# Patient Record
Sex: FEMALE | Race: WHITE
[De-identification: names, ages, dates, MRNs, and addresses within clinical notes are randomized per-mention and may not be internally consistent; named-entity substitution may affect disease eponyms.]

---

## 2019-06-30 ENCOUNTER — HOSPITAL ENCOUNTER (INPATIENT)
Dept: HOSPITAL 92 - ERS | Age: 84
LOS: 12 days | Discharge: NURSING, REHAB OR LONG-TERM CARE | DRG: 273 | End: 2019-07-12
Attending: FAMILY MEDICINE | Admitting: FAMILY MEDICINE
Payer: MEDICARE

## 2019-06-30 VITALS — BODY MASS INDEX: 33.1 KG/M2

## 2019-06-30 DIAGNOSIS — I47.1: ICD-10-CM

## 2019-06-30 DIAGNOSIS — N17.9: ICD-10-CM

## 2019-06-30 DIAGNOSIS — E03.9: ICD-10-CM

## 2019-06-30 DIAGNOSIS — L12.0: ICD-10-CM

## 2019-06-30 DIAGNOSIS — E87.6: ICD-10-CM

## 2019-06-30 DIAGNOSIS — D72.829: ICD-10-CM

## 2019-06-30 DIAGNOSIS — E87.1: ICD-10-CM

## 2019-06-30 DIAGNOSIS — I95.9: ICD-10-CM

## 2019-06-30 DIAGNOSIS — I11.0: ICD-10-CM

## 2019-06-30 DIAGNOSIS — J96.01: ICD-10-CM

## 2019-06-30 DIAGNOSIS — I48.91: Primary | ICD-10-CM

## 2019-06-30 DIAGNOSIS — D64.9: ICD-10-CM

## 2019-06-30 DIAGNOSIS — N18.3: ICD-10-CM

## 2019-06-30 DIAGNOSIS — Z79.4: ICD-10-CM

## 2019-06-30 DIAGNOSIS — E78.5: ICD-10-CM

## 2019-06-30 DIAGNOSIS — I50.33: ICD-10-CM

## 2019-06-30 DIAGNOSIS — K59.00: ICD-10-CM

## 2019-06-30 DIAGNOSIS — E66.9: ICD-10-CM

## 2019-06-30 DIAGNOSIS — E11.22: ICD-10-CM

## 2019-06-30 DIAGNOSIS — I27.20: ICD-10-CM

## 2019-06-30 LAB
ALBUMIN SERPL BCG-MCNC: 3.1 G/DL (ref 3.4–4.8)
ALP SERPL-CCNC: 100 U/L (ref 40–150)
ALT SERPL W P-5'-P-CCNC: 13 U/L (ref 8–55)
ANION GAP SERPL CALC-SCNC: 17 MMOL/L (ref 10–20)
APTT PPP: 25 SEC (ref 22.9–36.1)
AST SERPL-CCNC: 9 U/L (ref 5–34)
BASOPHILS # BLD AUTO: 0 THOU/UL (ref 0–0.2)
BASOPHILS NFR BLD AUTO: 0.3 % (ref 0–1)
BILIRUB SERPL-MCNC: 0.5 MG/DL (ref 0.2–1.2)
BUN SERPL-MCNC: 21 MG/DL (ref 9.8–20.1)
CALCIUM SERPL-MCNC: 7.9 MG/DL (ref 7.8–10.44)
CHLORIDE SERPL-SCNC: 102 MMOL/L (ref 98–107)
CK MB SERPL-MCNC: 1.6 NG/ML (ref 0–6.6)
CK SERPL-CCNC: 25 U/L (ref 29–168)
CO2 SERPL-SCNC: 18 MMOL/L (ref 23–31)
CREAT CL PREDICTED SERPL C-G-VRATE: 0 ML/MIN (ref 70–130)
EOSINOPHIL # BLD AUTO: 0.1 THOU/UL (ref 0–0.7)
EOSINOPHIL NFR BLD AUTO: 1.2 % (ref 0–10)
GLOBULIN SER CALC-MCNC: 2.1 G/DL (ref 2.4–3.5)
GLUCOSE SERPL-MCNC: 300 MG/DL (ref 83–110)
GLUCOSE UR STRIP-MCNC: 100 MG/DL
HGB BLD-MCNC: 11.8 G/DL (ref 12–16)
HYALINE CASTS #/AREA URNS LPF: (no result) LPF
INR PPP: 1.1
LIPASE SERPL-CCNC: 16 U/L (ref 8–78)
LYMPHOCYTES # BLD: 2.3 THOU/UL (ref 1.2–3.4)
LYMPHOCYTES NFR BLD AUTO: 19.7 % (ref 21–51)
MAGNESIUM SERPL-MCNC: 1.4 MG/DL (ref 1.6–2.6)
MCH RBC QN AUTO: 33 PG (ref 27–31)
MCV RBC AUTO: 96.8 FL (ref 78–98)
MONOCYTES # BLD AUTO: 0.7 THOU/UL (ref 0.11–0.59)
MONOCYTES NFR BLD AUTO: 6.2 % (ref 0–10)
NEUTROPHILS # BLD AUTO: 8.3 THOU/UL (ref 1.4–6.5)
NEUTROPHILS NFR BLD AUTO: 72.6 % (ref 42–75)
PLATELET # BLD AUTO: 135 THOU/UL (ref 130–400)
POTASSIUM SERPL-SCNC: 3.6 MMOL/L (ref 3.5–5.1)
PROTHROMBIN TIME: 13.9 SEC (ref 12–14.7)
RBC # BLD AUTO: 3.56 MILL/UL (ref 4.2–5.4)
SODIUM SERPL-SCNC: 133 MMOL/L (ref 136–145)
TROPONIN I SERPL DL<=0.01 NG/ML-MCNC: 0.1 NG/ML (ref ?–0.03)
TROPONIN I SERPL DL<=0.01 NG/ML-MCNC: 0.13 NG/ML (ref ?–0.03)
URNS CMNT MICRO: (no result)
WBC # BLD AUTO: 11.5 THOU/UL (ref 4.8–10.8)
WBC UR QL AUTO: (no result) HPF (ref 0–3)

## 2019-06-30 PROCEDURE — 96361 HYDRATE IV INFUSION ADD-ON: CPT

## 2019-06-30 PROCEDURE — 83605 ASSAY OF LACTIC ACID: CPT

## 2019-06-30 PROCEDURE — 36415 COLL VENOUS BLD VENIPUNCTURE: CPT

## 2019-06-30 PROCEDURE — 93653 COMPRE EP EVAL TX SVT: CPT

## 2019-06-30 PROCEDURE — C1769 GUIDE WIRE: HCPCS

## 2019-06-30 PROCEDURE — 80053 COMPREHEN METABOLIC PANEL: CPT

## 2019-06-30 PROCEDURE — 83735 ASSAY OF MAGNESIUM: CPT

## 2019-06-30 PROCEDURE — 85610 PROTHROMBIN TIME: CPT

## 2019-06-30 PROCEDURE — 84439 ASSAY OF FREE THYROXINE: CPT

## 2019-06-30 PROCEDURE — 96376 TX/PRO/DX INJ SAME DRUG ADON: CPT

## 2019-06-30 PROCEDURE — 84100 ASSAY OF PHOSPHORUS: CPT

## 2019-06-30 PROCEDURE — 83880 ASSAY OF NATRIURETIC PEPTIDE: CPT

## 2019-06-30 PROCEDURE — 84443 ASSAY THYROID STIM HORMONE: CPT

## 2019-06-30 PROCEDURE — 87086 URINE CULTURE/COLONY COUNT: CPT

## 2019-06-30 PROCEDURE — 93613 INTRACARDIAC EPHYS 3D MAPG: CPT

## 2019-06-30 PROCEDURE — 93005 ELECTROCARDIOGRAM TRACING: CPT

## 2019-06-30 PROCEDURE — 87070 CULTURE OTHR SPECIMN AEROBIC: CPT

## 2019-06-30 PROCEDURE — A4353 INTERMITTENT URINARY CATH: HCPCS

## 2019-06-30 PROCEDURE — 87077 CULTURE AEROBIC IDENTIFY: CPT

## 2019-06-30 PROCEDURE — 87205 SMEAR GRAM STAIN: CPT

## 2019-06-30 PROCEDURE — 81003 URINALYSIS AUTO W/O SCOPE: CPT

## 2019-06-30 PROCEDURE — 85730 THROMBOPLASTIN TIME PARTIAL: CPT

## 2019-06-30 PROCEDURE — 76942 ECHO GUIDE FOR BIOPSY: CPT

## 2019-06-30 PROCEDURE — 87186 SC STD MICRODIL/AGAR DIL: CPT

## 2019-06-30 PROCEDURE — 70450 CT HEAD/BRAIN W/O DYE: CPT

## 2019-06-30 PROCEDURE — 93306 TTE W/DOPPLER COMPLETE: CPT

## 2019-06-30 PROCEDURE — 80048 BASIC METABOLIC PNL TOTAL CA: CPT

## 2019-06-30 PROCEDURE — 84484 ASSAY OF TROPONIN QUANT: CPT

## 2019-06-30 PROCEDURE — 82550 ASSAY OF CK (CPK): CPT

## 2019-06-30 PROCEDURE — 83036 HEMOGLOBIN GLYCOSYLATED A1C: CPT

## 2019-06-30 PROCEDURE — C1730 CATH, EP, 19 OR FEW ELECT: HCPCS

## 2019-06-30 PROCEDURE — 71045 X-RAY EXAM CHEST 1 VIEW: CPT

## 2019-06-30 PROCEDURE — 93623 PRGRMD STIMJ&PACG IV RX NFS: CPT

## 2019-06-30 PROCEDURE — 51701 INSERT BLADDER CATHETER: CPT

## 2019-06-30 PROCEDURE — 93010 ELECTROCARDIOGRAM REPORT: CPT

## 2019-06-30 PROCEDURE — C1732 CATH, EP, DIAG/ABL, 3D/VECT: HCPCS

## 2019-06-30 PROCEDURE — 96366 THER/PROPH/DIAG IV INF ADDON: CPT

## 2019-06-30 PROCEDURE — 96365 THER/PROPH/DIAG IV INF INIT: CPT

## 2019-06-30 PROCEDURE — 36416 COLLJ CAPILLARY BLOOD SPEC: CPT

## 2019-06-30 PROCEDURE — 94760 N-INVAS EAR/PLS OXIMETRY 1: CPT

## 2019-06-30 PROCEDURE — 85025 COMPLETE CBC W/AUTO DIFF WBC: CPT

## 2019-06-30 PROCEDURE — 83690 ASSAY OF LIPASE: CPT

## 2019-06-30 PROCEDURE — 81015 MICROSCOPIC EXAM OF URINE: CPT

## 2019-06-30 PROCEDURE — 82553 CREATINE MB FRACTION: CPT

## 2019-06-30 NOTE — PDOC.FPRHP
- History of Present Illness


Chief Complaint: Weakness, dizziness


History of Present Illness: 


Ms Bruno is an 85yo female with pmh of HTN, HLD, DM and bullous pemphigoid 

who presented to ED by EMS for dizziness and weakness that started Wed 6/26 and 

worsened over the last day to the point her daughter could not transfer her. 

She typically ambulates with walker on her own but has become too short of 

breath and weak to walk. She was recently admitted to [facility] and was 

diagnosed with bullous pemphigoid and treated for NABILA/hyperkalemia. Kidney 

function returned to baseline and she was discharged June 17th. Her daughter 

has been taking care of her since. Reports she developed fatigue Wednesday, saw 

PCP and was diagnosed with hypothyroidism for which she started taking 

Synthroid for on Saturday 6/29. Her symptoms worsened yesterday. Endorses 

orthopnea, paroxysmal noctural dyspnea, b/l LE edema. Denies fevers, chills. No 

changes in mental status. Initially in Afib with RVR at presentation to ED, now 

rate controlled on Dilt gtt at 5, pt reports she feels much better. 








PCP: Dr Torres in [place], [place]





ED Course: 


Initial -180's. Currently 90's on Dilt gtt. Requiring 2L NC. Given ASA, 

bacitracin Zinc, 1L NS. EKG: Initial EKG SVT, #2 Afib with RVR. 








- Allergies/Adverse Reactions


 Allergies











Allergy/AdvReac Type Severity Reaction Status Date / Time


 


No Known Allergies Allergy   Unverified 06/30/19 10:21














- Home Medications


 











 Medication  Instructions  Recorded  Confirmed  Type


 


Aspirin [Ecotrin Low Strength] 81 mg PO QAM 06/30/19 06/30/19 History


 


Niacin 100 mg PO BID 06/30/19 06/30/19 History


 


Triamcinolone Acetonide 1 g TOP BID 06/30/19 06/30/19 History





[Triamcinolone Acetonide 0.1%    





Ointment]    


 


Verapamil ER [Calan ER] 180 mg PO HS 06/30/19 06/30/19 History


 


predniSONE [Prednisone] 20 mg PO QAM 06/30/19 06/30/19 History














- History


PMHx: HLD, HTN, DM, bullous pemphigoid, new dx of hypothyroidism


 


PSHx: None





FHx: CHF


 


Social: Denies tobacco, alcohol or drug use. Currently living with daughter 

with  services. 


 








- Review of Systems


General: reports: fatigue.  denies: fever/chills, weight/appetite/sleep changes


Eyes: denies: eye pain, vision changes


ENT: denies: nasal congestion, rhinorrhea


Respiratory: reports: shortness of breath.  denies: cough, congestion


Cardiovascular: reports: edema, paroxysmal nocturnal dyspnea, orthopnea.  denies

: chest pain, palpitation


Gastrointestinal: reports: constipation, abdominal pain.  denies: nausea, 

vomiting, diarrhea


Genitourinary: denies: dysuria, other (hematuria)


Skin: reports: lesions (bullous pemphigoid).  denies: rashes


Musculoskeletal: reports: pain (under breasts), swelling


Neurological: reports: weakness (generalized).  denies: numbness, syncope





- Vital signs


BP: 142/80  HR: 90 RR: 22 Tmax: 98.0 Pox: 96% on 2L  Wt: 72.6kg   








- Physical Exam


Constitutional: NAD, awake, alert and oriented, well developed


HEENT: normocephalic and atraumatic, PERRLA, conjunctiva clear, no scleral 

icterus, MMM, oropharynx clear


Neck: supple, trachea midline, no JVD, no bruits


Heart: RRR, no murmurs/rubs/gallops, other (2+ pitting edema to knees 

bilaterally)


Lungs: CTAB


Abdomen: soft, bowel sounds present, other (moderately tender to palpation in 

lower quadrants. No rigidity or rebound.)


Musculoskeletal: normal structure, normal tone, ROM grossly normal


Neurological: no focal deficit


Skin: other (wounds under bilateral breasts and pannus with serous drainage on 

dressings. Tender to palpation. No surrounding erythema)


Psychiatric: normal mood and affect, good judgment and insight, intact recent 

and remote memory





FMR H&P: Results





- Labs


Result Diagrams: 


 06/30/19 10:22





 06/30/19 10:22


Lab results: 


 











WBC  11.5 thou/uL (4.8-10.8)  H  06/30/19  10:22    


 


Hgb  11.8 g/dL (12.0-16.0)  L  06/30/19  10:22    


 


Hct  34.5 % (36.0-47.0)  L  06/30/19  10:22    


 


MCV  96.8 fL (78.0-98.0)   06/30/19  10:22    


 


Plt Count  135 thou/uL (130-400)   06/30/19  10:22    


 


Neutrophils %  72.6 % (42.0-75.0)   06/30/19  10:22    


 


Sodium  133 mmol/L (136-145)  L  06/30/19  10:22    


 


Potassium  3.6 mmol/L (3.5-5.1)   06/30/19  10:22    


 


Chloride  102 mmol/L ()   06/30/19  10:22    


 


Carbon Dioxide  18 mmol/L (23-31)  L  06/30/19  10:22    


 


BUN  21 mg/dL (9.8-20.1)  H  06/30/19  10:22    


 


Creatinine  1.13 mg/dL (0.6-1.1)  H  06/30/19  10:22    


 


Glucose  300 mg/dL ()  H  06/30/19  10:22    


 


Lactic Acid  2.4 mmol/L (0.5-2.2)  H  06/30/19  10:39    


 


Calcium  7.9 mg/dL (7.8-10.44)   06/30/19  10:22    


 


Total Bilirubin  0.5 mg/dL (0.2-1.2)   06/30/19  10:22    


 


AST  9 U/L (5-34)   06/30/19  10:22    


 


ALT  13 U/L (8-55)   06/30/19  10:22    


 


Alkaline Phosphatase  100 U/L ()   06/30/19  10:22    


 


Creatine Kinase  25 U/L ()  L  06/30/19  10:22    


 


Serum Total Protein  5.2 g/dL (6.0-8.3)  L  06/30/19  10:22    


 


Albumin  3.1 g/dL (3.4-4.8)  L  06/30/19  10:22    


 


Lipase  16 U/L (8-78)   06/30/19  10:22    


 


Urine Ketones  Negative mg/dL (Negative)   06/30/19  10:07    


 


Urine Blood  Negative  (Negative)   06/30/19  10:07    


 


Urine Nitrite  Negative  (Negative)   06/30/19  10:07    


 


Ur Leukocyte Esterase  Small  (Negative)  H  06/30/19  10:07    


 


Urine RBC  None Seen HPF (0-3)   06/30/19  10:07    


 


Urine WBC  0-3 HPF (0-3)   06/30/19  10:07    


 


Ur Squamous Epith Cells  0-3 HPF (0-3)   06/30/19  10:07    


 


Urine Bacteria  None Seen HPF (None Seen)   06/30/19  10:07    














- EKG Interpretation


EKG: 











Rate (beats per minute): 186, supraventricular tachycardia, Axis normal, 

Clinical impression:, abnormal EKG, marked ST abnormality, possible 

inferolateral subendocardial injury.














 12 lead EKG interpreted by Emergency Department Physician at time of study, 12 

lead EKG shows, atrial fibrillation with rapid ventricular response, Rate (

beats per minute): 124, Axis normal, Clinical impression:, abnormal EKG, ST & T 

wave abnormality, consider inferior ischemia or digitalis effect. ST & T wave 

abnormality, consider anterior ischemia or digitalis effect.

















- Radiology Interpretation


  ** CT scan - chest


Status: report reviewed by me


Additional comment: 


Borderline cardiomegaly





FMR H&P: A/P





- Problem List


(1) Atrial fibrillation with RVR


Current Visit: No   Status: Acute   Code(s): I48.91 - UNSPECIFIED ATRIAL 

FIBRILLATION   





(2) Hypothyroidism


Current Visit: No   Status: Acute   Code(s): E03.9 - HYPOTHYROIDISM, 

UNSPECIFIED   





(3) Hyponatremia


Current Visit: No   Status: Acute   Code(s): E87.1 - HYPO-OSMOLALITY AND 

HYPONATREMIA   





(4) Normocytic anemia


Current Visit: No   Status: Acute   Code(s): D64.9 - ANEMIA, UNSPECIFIED   





(5) Bullous pemphigoid


Current Visit: No   Status: Acute   Code(s): L12.0 - BULLOUS PEMPHIGOID   





(6) HTN (hypertension)


Current Visit: No   Status: Acute   Code(s): I10 - ESSENTIAL (PRIMARY) 

HYPERTENSION   





(7) HLD (hyperlipidemia)


Current Visit: No   Status: Acute   Code(s): E78.5 - HYPERLIPIDEMIA, 

UNSPECIFIED   





(8) Acute respiratory failure with hypoxia


Current Visit: No   Status: Acute   Code(s): J96.01 - ACUTE RESPIRATORY FAILURE 

WITH HYPOXIA   





(9) NABILA (acute kidney injury)


Current Visit: No   Status: Acute   Code(s): N17.9 - ACUTE KIDNEY FAILURE, 

UNSPECIFIED   





(10) Leukocytosis


Current Visit: No   Status: Acute   Code(s): D72.829 - ELEVATED WHITE BLOOD 

CELL COUNT, UNSPECIFIED   





(11) Insulin dependent diabetes mellitus


Current Visit: No   Status: Acute   Code(s): E11.9 - TYPE 2 DIABETES MELLITUS 

WITHOUT COMPLICATIONS; Z79.4 - LONG TERM (CURRENT) USE OF INSULIN   





- Plan


Ms Bruno is an 85yo female with pmh of HTN, HLD, DM and bullous pemphigoid 

admitted for Afib with RVR





Afib with RVR


- Reports no hx of afib


- Appears to be volume overloaded with edema, new O2 requirement


- Currently rate controlled with Dilt gtt at 5


- Initial EKG with SVT, repeat EKG showed Afib with RVR


- Echo ordered


- Ordered TSH, Mg, Ph, BNP


- Admit to tele





Acute hypoxic respiratory failure likely 2/2 pulm congestion related to Afib 

with RVR


- Continue O2, wean as tolerated


- Should improve with rate control





Indeterminate Troponin


- Continue to trend





NABILA likely prerenal 


- Cr 1.13


- Continue to monitor with daily BMP


- Avoid nephrotoxic medications





IDDMII


- Continue home Lantus 45U qAM, pt prev on Metformin in addition but stopped 

during recent hospitalization likely due to kidney function


- CC diet


- ACHS accuchecks, hypoglycemic protocol


- Ordered A1c





Normocytic Anemia


- Hgb 11.8





Hypothyroidism


- Recently diagnosed, started medications 6/29


- Ordered TSH





Constipation


- Will start Miralax and Colace TACO





Leukocytosis


- Likely 2/2 steroid use 





Hyponatremia


- 133, continue to monitor with daily BMP





Bullous Pemphigoid


- Wound care consulted





HTN


- Continue home meds





HLD


- Continue home meds








Code Status: FULL


DVT ppx: Lovenox





PCP: Dr Torres in [place], [place]








FMR H&P: Upper Level





- Plan


Date/Time: 06/30/19 1107








I, [], have evaluated this patient and agree with findings/plan as outlined by 

intern resident. Pertinent changes/additions are listed here.

## 2019-06-30 NOTE — CT
Exam: Head CT without contrast





HISTORY: Altered mental status



COMPARISON: 12/26/2016





FINDINGS:

Hemorrhage: No intraparenchymal hemorrhage or extra-axial hematoma.



Brain parenchyma: Cortical gray-white matter differentiation is preserved. No mass effect or midline 
shift. Basilar cisterns are patent.White matter hypodensities due to chronic small vessel ischemic

changes.

Ventricular system: Ventricles and sulci are patent and symmetric.

Calvarium: Intact.

Sinuses and mastoid air cells: Adequate mastoid air cell aeration. Mucous retention cyst in the right
 maxillary sinus. Mixed attenuation density in the right sphenoid sinus likely representing

inspissated mucus/secretions versus a fungal infection.

Atherosclerosis of cavernous carotid arteries bilaterally

IMPRESSION:

No acute intracranial process.









Reported By: Kaela Pendleton 

Electronically Signed:  6/30/2019 11:04 AM

## 2019-06-30 NOTE — RAD
XR Chest 1 View Portable



HISTORY: Syncope. A. fib.



COMPARISON: 12/26/2016 study



FINDINGS: Heart size appears slightly enlarged. There are atherosclerotic changes of aorta. The lungs
 are clear of infiltrates. There are no signs of failure.



IMPRESSION: Borderline to minimal cardiomegaly.



Reported By: Jeffrey David 

Electronically Signed:  6/30/2019 10:30 AM

## 2019-07-01 LAB
ANION GAP SERPL CALC-SCNC: 13 MMOL/L (ref 10–20)
BASOPHILS # BLD AUTO: 0 THOU/UL (ref 0–0.2)
BASOPHILS NFR BLD AUTO: 0.1 % (ref 0–1)
BUN SERPL-MCNC: 20 MG/DL (ref 9.8–20.1)
CALCIUM SERPL-MCNC: 8.2 MG/DL (ref 7.8–10.44)
CHLORIDE SERPL-SCNC: 105 MMOL/L (ref 98–107)
CO2 SERPL-SCNC: 20 MMOL/L (ref 23–31)
CREAT CL PREDICTED SERPL C-G-VRATE: 65 ML/MIN (ref 70–130)
EOSINOPHIL # BLD AUTO: 0.2 THOU/UL (ref 0–0.7)
EOSINOPHIL NFR BLD AUTO: 1.9 % (ref 0–10)
GLUCOSE SERPL-MCNC: 235 MG/DL (ref 83–110)
HGB BLD-MCNC: 11.9 G/DL (ref 12–16)
LYMPHOCYTES # BLD: 2.5 THOU/UL (ref 1.2–3.4)
LYMPHOCYTES NFR BLD AUTO: 22.5 % (ref 21–51)
MAGNESIUM SERPL-MCNC: 1.9 MG/DL (ref 1.6–2.6)
MCH RBC QN AUTO: 32.8 PG (ref 27–31)
MCV RBC AUTO: 97.9 FL (ref 78–98)
MONOCYTES # BLD AUTO: 0.6 THOU/UL (ref 0.11–0.59)
MONOCYTES NFR BLD AUTO: 5.6 % (ref 0–10)
NEUTROPHILS # BLD AUTO: 7.6 THOU/UL (ref 1.4–6.5)
NEUTROPHILS NFR BLD AUTO: 69.9 % (ref 42–75)
PLATELET # BLD AUTO: 125 THOU/UL (ref 130–400)
POTASSIUM SERPL-SCNC: 3.8 MMOL/L (ref 3.5–5.1)
RBC # BLD AUTO: 3.62 MILL/UL (ref 4.2–5.4)
SODIUM SERPL-SCNC: 134 MMOL/L (ref 136–145)
WBC # BLD AUTO: 10.9 THOU/UL (ref 4.8–10.8)

## 2019-07-01 RX ADMIN — INSULIN GLARGINE SCH MLS: 100 INJECTION, SOLUTION SUBCUTANEOUS at 10:40

## 2019-07-01 RX ADMIN — INSULIN LISPRO PRN UNIT: 100 INJECTION, SOLUTION INTRAVENOUS; SUBCUTANEOUS at 17:21

## 2019-07-01 RX ADMIN — INSULIN LISPRO PRN UNIT: 100 INJECTION, SOLUTION INTRAVENOUS; SUBCUTANEOUS at 10:47

## 2019-07-01 RX ADMIN — ASPIRIN SCH MG: 81 TABLET ORAL at 10:43

## 2019-07-01 NOTE — PDOC.FM
- Subjective


Subjective: 





Pt states she did not sleep well last night and complains of continued 

orthopnea. Daughter notes that pt becomes quite short of breath with any 

ambulation. She denies any chest pain or palpitations this AM. 





- Objective


Vital Signs & Weight: 


 Vital Signs (12 hours)











  Temp Pulse Resp BP Pulse Ox


 


 07/01/19 03:38  97.7 F  84  17  138/61  97


 


 07/01/19 00:00   87   144/67 H 


 


 06/30/19 20:00  98 F  82  18  106/62  93 L








 Weight











Weight                         93.259 kg














Result Diagrams: 


 07/01/19 04:14





 07/01/19 04:14





Phys Exam





- Physical Examination


Respiratory: no wheezing


Crackles at bilateral bases, respiratory distress improves when sitting up


Cardiovascular: RRR, no significant murmur


Gastrointestinal: soft, non-tender


Musculoskeletal: pulses present, edema present (2+ bilateral LE)


Neurological: non-focal, normal sensation


Psychiatric: normal affect, A&O x 3


Deviation from normal: mildly wheeping lesions to lower chest and abdomen





Dx/Plan


(1) Atrial fibrillation with RVR


Code(s): I48.91 - UNSPECIFIED ATRIAL FIBRILLATION   Status: Resolved   





(2) NABILA (acute kidney injury)


Code(s): N17.9 - ACUTE KIDNEY FAILURE, UNSPECIFIED   Status: Resolved   





(3) Acute respiratory failure with hypoxia


Code(s): J96.01 - ACUTE RESPIRATORY FAILURE WITH HYPOXIA   Status: Acute   





(4) Bullous pemphigoid


Code(s): L12.0 - BULLOUS PEMPHIGOID   Status: Acute   





(5) Hypothyroidism


Code(s): E03.9 - HYPOTHYROIDISM, UNSPECIFIED   Status: Acute   





(6) Insulin dependent diabetes mellitus


Code(s): E11.9 - TYPE 2 DIABETES MELLITUS WITHOUT COMPLICATIONS; Z79.4 - LONG 

TERM (CURRENT) USE OF INSULIN   Status: Acute   





- Plan


Plan: 





Afib with RVR


- Reports no hx of afib


- Appears to be volume overloaded with edema and orthopnea, new O2 requirement


- Currently rate controlled with Dilt gtt at 5


- Initial EKG with SVT, repeat EKG showed Afib with RVR


- Echo today


- , likely fluid overloaded with edema, will diurese today


- Admit to tele





Acute hypoxic respiratory failure likely 2/2 pulm congestion related to Afib 

with RVR/CHF


- Continue O2, wean as tolerated


- Should improve with rate control and diuresis





Congestive heart failure


- likely diagnosis considering new O2 requirements, orthopnea, and peripheral 

edema


- could be cause of new onset Afib


- Echo today to assess


- will initiate lasix diuresis this morning





Indeterminate Troponin


- Continue to trend





ANBILA likely prerenal 


- Cr 1.13 -> 0.91


- Continue to monitor with daily BMP


- Avoid nephrotoxic medications





IDDMII


- Continue home Lantus 45U qAM, pt prev on Metformin in addition but stopped 

during recent hospitalization likely due to kidney function


- CC diet


- ACHS accuchecks, hypoglycemic protocol


- Ordered A1c


- Expect elevated glucose levels d/t steroid use





Normocytic Anemia


- Hgb 11.8





Hypothyroidism


- Recently diagnosed, started medications 6/29


- TSH and FT4 is nml





Constipation


- Will start Miralax and Colace TACO





Leukocytosis


- Likely 2/2 steroid use 





Hyponatremia


- 134, continue to monitor with daily BMP





Bullous Pemphigoid


- Wound care consulted





HTN


- Continue home meds





HLD


- Continue home meds





Addendum - Attending





- Attending Attestation


Date/Time: 07/01/19 1300





I personally evaluated the patient and discussed the management with Dr. Mariano.


I agree with the History, Examination, Assessment and Plan documented above 

with any addition or exceptions noted below.


The patient's A.fib has converted to a sinus rhythm.  Will transition back to pt

's po verapamil.  With elevated bnp, edema and dyspnea, suspect new onset chf.  

Echo is pending.  Will begin IV lasix, strict I/O's.  Try to wean O2 as 

tolerated.

## 2019-07-01 NOTE — HP
ADDENDUM:  This is an addendum to the history and physical.  Please see the note

from Dr. Rosita Chapa for which I agree.  The patient was seen, evaluated,

discussed and examined with the residents. 



HISTORY OF PRESENT ILLNESS:  This is an 86-year-old female being brought in for [facility]

[facility].  She [place] sees a doctor in [place].  The history is that a couple weeks

ago she was at [facility], although a couple times the daughter said [facility] for sounds like may be dehydration and acute kidney injury and workup

for the numerous source she had all-over the body that eventually was diagnosed with

bullous pemphigoid.  She is on steroids now as a type 2 diabetic and felt like her

sugars have been little bit out of control.  They do get additional history though

that in the last couple years, she intermittently is having bouts of extreme

shortness of breath.  No palpitations, syncope, or near syncope.  But just in

general feeling weaker with time, has never had a cardiac issue or problem as far as

they know, but when she initially came in here, she was in atrial fibrillation with

rapid ventricular response, pulse rate as high as 160s to 180s, was put on a

diltiazem drip at 5 and is actually now already converted to normal sinus rhythm,

although she still has a lot of ectopic beats given nasal cannula O2 for low oxygen

as well.  Maybe having a little bit of orthopnea, typically sleeping in a recliner,

had an angle to help, but then only describe a lot of edema, her chest pain. 



ALLERGIES:  ALL PER DR. CHAPA' HISTORY AND PHYSICAL FOR WHICH I AGREE.



PAST MEDICAL HISTORY:  All per Dr. Chapa' history and physical for which I agree.



PAST SURGICAL HISTORY:  All per Dr. Chapa' history and physical for which I agree.



FAMILY HISTORY:  All per Dr. Chapa' history and physical for which I agree.



SOCIAL HISTORY:  All per Dr. Chapa' history and physical for which I agree.



REVIEW OF SYSTEMS:  All per Dr. Chapa' history and physical for which I agree.



PHYSICAL EXAMINATION:

VITAL SIGNS:  Blood pressure 130s over 60s when I was in the room.  Pulse rate was

at 89 on the monitor, it was like normal sinus rhythm with occasional ectopic beats.

 Currently, her O2 sats 98% on 2 L, breathing comfortably.  No apparent distress. 

HEENT:  Conjunctivae not particularly pale.  Anicteric.  Moist mucosa. 

NECK:  No JVD, lymphadenopathy, or bruits. 

CHEST:  Slight decreased breath sounds. 

HEART:  Regular rate and rhythm with ectopic beats.  I could not appreciate murmurs. 

ABDOMEN:  Benign. 

EXTREMITIES:  Show trace edema.



DIAGNOSTIC IMPRESSION:  EKG was read as atrial fibrillation, rapid ventricular

response.  Pulse rate in the 120s, although lateral ischemia. 



LABORATORY DATA:  Significant for slight white count of 11, hemoglobin 11, BUN 21,

creatinine of 1.1.  Sugar was high at 300.  Sodium minimally low at 133.  Lactic

acid slightly high at 2.4.  Urine looks pretty benign. 



ASSESSMENT AND PLAN:  

1. Rapid ventricular response with atrial fibrillation, now back to normal sinus

rhythm on a Cardizem drip.  Plans; put her up on tele, may switch the Cardizem just

p.o., now she is back in sinus rhythm.  We will get Cardiology involved. 

2. Severe weakness, unclear if it is all from cardiac issues while we are dealing

with.  Certainly, we will get an echocardiogram. 

3. Hyponatremia.  Watch closely.

4. Mild anemia.

5. Bullous pemphigoid.  We will continue steroids.

6. Diabetes, now on steroids; certainly, do her daily Lantus and a sliding scale.







Job ID:  [id number]

## 2019-07-02 LAB
ANION GAP SERPL CALC-SCNC: 17 MMOL/L (ref 10–20)
BASOPHILS # BLD AUTO: 0.1 THOU/UL (ref 0–0.2)
BASOPHILS NFR BLD AUTO: 0.4 % (ref 0–1)
BUN SERPL-MCNC: 13 MG/DL (ref 9.8–20.1)
CALCIUM SERPL-MCNC: 8 MG/DL (ref 7.8–10.44)
CHLORIDE SERPL-SCNC: 106 MMOL/L (ref 98–107)
CO2 SERPL-SCNC: 19 MMOL/L (ref 23–31)
CREAT CL PREDICTED SERPL C-G-VRATE: 77 ML/MIN (ref 70–130)
EOSINOPHIL # BLD AUTO: 0.3 THOU/UL (ref 0–0.7)
EOSINOPHIL NFR BLD AUTO: 2.2 % (ref 0–10)
GLUCOSE SERPL-MCNC: 78 MG/DL (ref 83–110)
HGB BLD-MCNC: 12.6 G/DL (ref 12–16)
LYMPHOCYTES # BLD: 2.5 THOU/UL (ref 1.2–3.4)
LYMPHOCYTES NFR BLD AUTO: 20 % (ref 21–51)
MAGNESIUM SERPL-MCNC: 1.7 MG/DL (ref 1.6–2.6)
MCH RBC QN AUTO: 32.6 PG (ref 27–31)
MCV RBC AUTO: 98.1 FL (ref 78–98)
MONOCYTES # BLD AUTO: 0.7 THOU/UL (ref 0.11–0.59)
MONOCYTES NFR BLD AUTO: 5.2 % (ref 0–10)
NEUTROPHILS # BLD AUTO: 8.9 THOU/UL (ref 1.4–6.5)
NEUTROPHILS NFR BLD AUTO: 72.1 % (ref 42–75)
PLATELET # BLD AUTO: 168 THOU/UL (ref 130–400)
POTASSIUM SERPL-SCNC: 3.9 MMOL/L (ref 3.5–5.1)
RBC # BLD AUTO: 3.86 MILL/UL (ref 4.2–5.4)
SODIUM SERPL-SCNC: 138 MMOL/L (ref 136–145)
WBC # BLD AUTO: 12.3 THOU/UL (ref 4.8–10.8)

## 2019-07-02 RX ADMIN — INSULIN GLARGINE SCH MLS: 100 INJECTION, SOLUTION SUBCUTANEOUS at 10:05

## 2019-07-02 RX ADMIN — ASPIRIN SCH MG: 81 TABLET ORAL at 08:44

## 2019-07-02 RX ADMIN — INSULIN LISPRO PRN UNIT: 100 INJECTION, SOLUTION INTRAVENOUS; SUBCUTANEOUS at 13:03

## 2019-07-02 RX ADMIN — INSULIN LISPRO PRN UNITS: 100 INJECTION, SOLUTION INTRAVENOUS; SUBCUTANEOUS at 21:24

## 2019-07-02 NOTE — PDOC.FM
- Subjective


Subjective: 





Pt states she slept much better last night. States her breathing has improved 

and she is not noticing any orthopnea. Still notes some dypsnea on exertion but 

feels this has improved as well. Pt had a short run of SVT in the early am, pt 

feels this was after ambulating to the restroom without her O2.





- Objective


MAR Reviewed: Yes


Vital Signs & Weight: 


 Vital Signs (12 hours)











  Temp Pulse Resp BP Pulse Ox


 


 07/02/19 04:00  97.3 F L  63  20  144/62 H  92 L


 


 07/01/19 19:39  97.4 F L  97  18  136/63  98








 Weight











Admit Weight                   92.306 kg


 


Weight                         91.172 kg














I&O: 


 











 06/30/19 07/01/19 07/02/19





 06:59 06:59 06:59


 


Intake Total  1010 1880


 


Output Total  0 1900


 


Balance  1010 -20











Result Diagrams: 


 07/02/19 06:36





 07/02/19 06:18





Phys Exam





- Physical Examination


Constitutional: NAD


HEENT: moist MMs


Neck: supple, full ROM


Respiratory: no wheezing


Fine crackles in bilateral bases


Cardiovascular: RRR, no significant murmur


Gastrointestinal: soft, non-tender


Musculoskeletal: edema present (1+ in bilateral LE)


Neurological: moves all 4 limbs


Psychiatric: normal affect, A&O x 3


Deviation from normal: Healing wounds to chest and abdomen from prev bollous 

pemphigoid





Dx/Plan


(1) Acute respiratory failure with hypoxia


Code(s): J96.01 - ACUTE RESPIRATORY FAILURE WITH HYPOXIA   Status: Acute   





(2) Bullous pemphigoid


Code(s): L12.0 - BULLOUS PEMPHIGOID   Status: Acute   





(3) HTN (hypertension)


Code(s): I10 - ESSENTIAL (PRIMARY) HYPERTENSION   Status: Acute   





(4) Insulin dependent diabetes mellitus


Code(s): E11.9 - TYPE 2 DIABETES MELLITUS WITHOUT COMPLICATIONS; Z79.4 - LONG 

TERM (CURRENT) USE OF INSULIN   Status: Acute   





(5) Leukocytosis


Code(s): D72.829 - ELEVATED WHITE BLOOD CELL COUNT, UNSPECIFIED   Status: Acute

   





- Plan


Plan: 





Afib with RVR


- Reports no hx of afib


- Appears to be volume overloaded with edema and orthopnea, new O2 requirement


- Currently rate controlled with Verapamil BID


- Initial EKG with SVT, repeat EKG showed Afib with RVR, resolved after CCB tx, 

short run of SVT overnight


- Echo to be read today


- , likely fluid overloaded with edema, will continue diuresis


- Continue tele





Acute hypoxic respiratory failure likely 2/2 pulm congestion related to Afib 

with RVR/CHF


- Continue O2, wean as tolerated


- Should improve with rate control and diuresis





Congestive heart failure


- likely diagnosis considering new O2 requirements, orthopnea, and peripheral 

edema


- could be cause of new onset Afib


- Echo yesterday, results pending


- will continue diuresis





Indeterminate Troponin


- Trend was negative





NABILA likely prerenal 


- Cr 1.13 -> 0.91


- Continue to monitor with daily BMP


- Avoid nephrotoxic medications





IDDMII


- Continue home Lantus 45U qAM, pt prev on Metformin in addition but stopped 

during recent hospitalization likely due to kidney function


- CC diet


- ACHS accuchecks, hypoglycemic protocol


- Ordered A1c


- Expect elevated glucose levels d/t steroid use





Normocytic Anemia


- Hgb 11.8





Hypothyroidism


- Recently diagnosed, started medications 6/29


- TSH and FT4 is nml





Constipation


- Will start Miralax and Colace TACO





Leukocytosis


- Likely 2/2 steroid use 





Hyponatremia


- 134, continue to monitor with daily BMP





Bullous Pemphigoid with secondary infection


- Wound care consulted


- Continue steroid taper


- Start Levoquin 500IV daily, abx course 7-10 days





HTN


- Continue home meds





HLD


- Continue home meds





Dispo:


Stable, gradually improving


LOS: Expect 2-3 additional days





Addendum - Attending





- Attending Attestation


Date/Time: 07/02/19 2748





I personally evaluated the patient and discussed the management with Dr. Andrews.


I agree with the History, Examination, Assessment and Plan documented above 

with any addition or exceptions noted below.


The patient's lower extremity edema is improving.  Will continue IV lasix.  

Awaiting echo result.  Wean O2 as tolerated.  Starting antibiotics for bullous 

pemphigoid with superimposed infection.

## 2019-07-02 NOTE — PDOC.EVN
Event Note





- Event Note


Event Note: 





Code green called @ approx 1630 for sustained SVT and hypotension. Pt reported 

no CP SOB; however, did feel lightheaded. Vitals showed HR @ 175, BP 90s/50s 

and O2 89%. Bedside EKG confirmed SVT. Pt had attempted valsalva prior to 

physician arrival. Pt remained in SVT and mildly hypotensive. As such 6mg IV 

adenosine was given and pt converted to NSR shortly after with a rate of 95. 

She was given 500mL bolus of NS. Labs were drawn including cardiac enzymes, CBC

, CMP, Mag and Phos. Results of labwork are currently pending. Repeat BP after 

conversion to SR showed BP 110s/60s. Cont telemetry monitoring.

## 2019-07-03 LAB
ANION GAP SERPL CALC-SCNC: 11 MMOL/L (ref 10–20)
BASOPHILS # BLD AUTO: 0 THOU/UL (ref 0–0.2)
BASOPHILS NFR BLD AUTO: 0.3 % (ref 0–1)
BUN SERPL-MCNC: 15 MG/DL (ref 9.8–20.1)
CALCIUM SERPL-MCNC: 8.1 MG/DL (ref 7.8–10.44)
CHLORIDE SERPL-SCNC: 102 MMOL/L (ref 98–107)
CO2 SERPL-SCNC: 28 MMOL/L (ref 23–31)
CREAT CL PREDICTED SERPL C-G-VRATE: 69 ML/MIN (ref 70–130)
EOSINOPHIL # BLD AUTO: 0.2 THOU/UL (ref 0–0.7)
EOSINOPHIL NFR BLD AUTO: 2.1 % (ref 0–10)
GLUCOSE SERPL-MCNC: 83 MG/DL (ref 83–110)
HGB BLD-MCNC: 11.8 G/DL (ref 12–16)
LYMPHOCYTES # BLD: 2.8 THOU/UL (ref 1.2–3.4)
LYMPHOCYTES NFR BLD AUTO: 24.6 % (ref 21–51)
MCH RBC QN AUTO: 33.2 PG (ref 27–31)
MCV RBC AUTO: 98 FL (ref 78–98)
MONOCYTES # BLD AUTO: 0.6 THOU/UL (ref 0.11–0.59)
MONOCYTES NFR BLD AUTO: 5.4 % (ref 0–10)
NEUTROPHILS # BLD AUTO: 7.8 THOU/UL (ref 1.4–6.5)
NEUTROPHILS NFR BLD AUTO: 67.6 % (ref 42–75)
PLATELET # BLD AUTO: 187 THOU/UL (ref 130–400)
POTASSIUM SERPL-SCNC: 3.4 MMOL/L (ref 3.5–5.1)
RBC # BLD AUTO: 3.56 MILL/UL (ref 4.2–5.4)
SODIUM SERPL-SCNC: 138 MMOL/L (ref 136–145)
WBC # BLD AUTO: 11.5 THOU/UL (ref 4.8–10.8)

## 2019-07-03 RX ADMIN — ASPIRIN SCH MG: 81 TABLET ORAL at 09:15

## 2019-07-03 RX ADMIN — INSULIN LISPRO PRN UNIT: 100 INJECTION, SOLUTION INTRAVENOUS; SUBCUTANEOUS at 17:15

## 2019-07-03 RX ADMIN — Medication SCH ML: at 09:21

## 2019-07-03 RX ADMIN — Medication SCH ML: at 20:42

## 2019-07-03 RX ADMIN — INSULIN LISPRO PRN UNITS: 100 INJECTION, SOLUTION INTRAVENOUS; SUBCUTANEOUS at 20:41

## 2019-07-03 RX ADMIN — INSULIN GLARGINE SCH MLS: 100 INJECTION, SOLUTION SUBCUTANEOUS at 09:16

## 2019-07-03 NOTE — CON
DATE OF CONSULTATION:  07/03/2019



HISTORY OF PRESENT ILLNESS:  I am seeing Ms. Bruno at our [facility] Telemetry Floor as an Electrophysiology consultant regarding her SVT

arrhythmias.  Her problems are; 

1. Recurrent and sustained narrow complex tachycardia with successful adenosine

termination.  One EKG this admission documents narrow complex SVT at 186 beats per

minute with short RP interval. 

2. Preserved LVEF at 60% to 65%, mild biatrial enlargement, mild MR, moderate TR,

and moderate pulmonary hypertension on 2D echo on 07/01/2019. 

3. History of diabetes.

4. History of hypertension.

5. History of hyperthyroidism, replaced.

6. History of obesity.

7. History of bullous pemphigoid.



ALLERGIES:  GLIPIZIDE, SULFA.



MEDICATIONS:  At home included,

1. Prednisone.

2. Verapamil 180 mg p.o. at bedtime ER.

3. Triamcinolone.

4. Niacin.

5. Aspirin.

6. Hydrocodone.

7. Ergocalciferol.

8. Calcium.

9. Insulin.

10. Linaclotide.

11. Levothyroxine.



SUBJECTIVE:  Ms. Bruno is here, admitted on the 30th with recurrent dizziness,

near syncopal spells, palpitations, and clear EKG documentation of narrow complex

supraventricular tachycardia.  These episodes have happened in the past, she is

unclear for how long, possibly years, but has not been this severe than recently.

Otherwise, she is feeling fair in between the bouts.  She has no chest pains.  No

dizziness or loss of consciousness.  No angina.  No stroke-like symptoms.  No

neurological deficits.  In the ER, she was put on a diltiazem drip and she converted

fairly promptly.  She has been recently evaluated at [facility] with her

dermatologic issue, the bullous pemphigoid, which at that point, multiple

medications were stopped.  She is still on verapamil though for her blood pressure. 



Currently, she has no fever, chills or cough, and the bullous pemphigoid, although

still bothering her and considering treatment for that. 



OBJECTIVE DATA:  VITAL SIGNS:  Blood pressure is 135/69, heart rate 90, respirations

18, temperature 98.1 degrees Fahrenheit. 

GENERAL:  She is alert and oriented woman with elevated BMI, in no apparent

distress. 

NECK:  Supple.  Jugular veins are not distended. 

CHEST:  Coarse with crackles. 

HEART:  Sounds are regular rate and rhythm.  No murmur or gallop. 

ABDOMEN:  Benign.  Bowel sounds positive. 

EXTREMITIES:  Lower extremities without edema, clubbing, or cyanosis. 

MUSCULOSKELETAL:  No joint swelling or deformity. 

SKIN:  With bullous rash under the breasts and in the abdomen, also in the left

intertrigo area. 



LABORATORY DATA:  White cell count is 11.5, hemoglobin 11.8, platelet count is 187.

INR 1.1.  Sodium 138, potassium 3.4, BUN is 15, creatinine 0.84.  Troponins I's are

0.11, 0.13, and 0.102.  Chest x-ray from 06/30 reveals borderline cardiomegaly. 



EKG again as noted above, initially a narrow complex SVT at 186 beats per minute

with short RP interval.  Subsequent EKG reveals normal narrow QRS, nonspecific ST-T

changes only. 



Telemetry strips also reviewed revealing the same this morning, frequent

recurrences, but shortly terminating atrial SVTs are seen. 



ASSESSMENT AND PLAN:  Ms. Bruno is an 86-year-old woman with history of

hypertension, diabetes, and bullous pemphigoid, who is presenting now with very

symptomatic sustained supraventricular tachycardia episodes seems to be still

recurrent, although not lasting much more than 25 minutes today.  She has been

placed on a high dose of verapamil.  Cardiac workup so far reveals preserved cardiac

function, but mild pulmonary hypertension. 



I discussed the etiology and potential treatment options for her supraventricular

tachycardia with the patient and her daughter.  We discussed the option of

increasing atrioventricular rufino blocking agents, which was already initiated and

seems to have somewhat improved the episodes, but not completely eliminated them

yet.  We also discussed the option for EP study and ablation procedure.  At this

point, hence the dermatological issues and her advanced age, family would like to

try medications first.  Tentatively if these efforts fail, she could be considered

for an ablation possibly early next week on 07/08/2019.  On the other hand, if good

success with increasing the verapamil or adding digoxin is achieved, she may be

discharged and then I will be happy to see her as an outpatient. 



Thank you for allowing me to participate in the care of this patient.







Job ID:  [id number]

## 2019-07-03 NOTE — PDOC.FM
- Subjective


Subjective: 





Pt was getting her bollous wounds dressed and experienced an episode of 

sustained SVT with hypotension yesterday afternoon around 1630 that converted 

appropriately with adenosine. Since then she has had several runs of SVT all 

lasting less than 25 seconds. Pt states that she rested well last night and she 

feels she is continuing to have improvement in her breathing. She is still on 

O2 at 2L and experiences increased dyspnea with exertion. 





- Objective


MAR Reviewed: Yes


Vital Signs & Weight: 


 Vital Signs (12 hours)











  Temp Pulse Resp BP Pulse Ox


 


 07/03/19 03:00  98.6 F  65  16  133/70  96


 


 07/02/19 23:27   73   112/59 L 


 


 07/02/19 19:45  97.9 F  86  16  111/60  95








 Weight











Admit Weight                   92.306 kg


 


Weight                         91.898 kg














I&O: 


 











 07/02/19 07/03/19 07/04/19





 06:59 06:59 06:59


 


Intake Total 1880 530 


 


Output Total 1900 1150 


 


Balance -20 -620 











Result Diagrams: 


 07/03/19 04:26





 07/03/19 04:26


Radiology: 





Echo yesterday: EF 60-65%, 1/3 diastolic dysfunction, dilated RV with reduced 

systolic function, moderate tricuspid regurgitation, RV pressure 50mmHg





Phys Exam





- Physical Examination


Constitutional: NAD


HEENT: moist MMs


Neck: supple, full ROM


Respiratory: no wheezing


Very fine crackles at bases bilaterally


Cardiovascular: RRR


Gastrointestinal: soft, non-tender


Musculoskeletal: pulses present, edema present (1+ on right, trace on left)


Neurological: non-focal, moves all 4 limbs


Psychiatric: normal affect, A&O x 3


Deviation from normal: Dressed bollous lesions to chest and abdomen





Dx/Plan


(1) Supraventricular tachycardia, paroxysmal


Code(s): I47.1 - SUPRAVENTRICULAR TACHYCARDIA   Status: Acute   





(2) Acute respiratory failure with hypoxia


Code(s): J96.01 - ACUTE RESPIRATORY FAILURE WITH HYPOXIA   Status: Acute   





(3) Bullous pemphigoid


Code(s): L12.0 - BULLOUS PEMPHIGOID   Status: Acute   





(4) HTN (hypertension)


Code(s): I10 - ESSENTIAL (PRIMARY) HYPERTENSION   Status: Acute   





(5) Insulin dependent diabetes mellitus


Code(s): E11.9 - TYPE 2 DIABETES MELLITUS WITHOUT COMPLICATIONS; Z79.4 - LONG 

TERM (CURRENT) USE OF INSULIN   Status: Acute   





(6) Leukocytosis


Code(s): D72.829 - ELEVATED WHITE BLOOD CELL COUNT, UNSPECIFIED   Status: Acute

   





- Plan


Plan: 





Paroxysmal SVT, previously Afib RVR


- Reports no hx of afib or arrhythmia


- Volume overloaded with edema, elevated BNP, orthopnea with new O2 requirement 

- improving with diuresis


- Currently rate controlled with Verapamil BID


- Initial EKG with SVT, repeat EKG showed Afib with RVR, resolved after CCB tx, 

short runs of SVT, one sustained event that converted with adenosine


- Echo: EF 60-65, 1/3 diastolic dysfunction, reduced RV systolic function


- Continue tele


- Cardiology consult pending for this am





Acute hypoxic respiratory failure likely 2/2 pulm congestion related to Afib 

with RVR/CHF


- Continue O2, wean as tolerated


- Should improve with rate control and diuresis





Congestive heart failure


- likely diagnosis considering new O2 requirements, orthopnea, and peripheral 

edema


- could be cause of new onset Afib/SVT


- Echo as above


- will continue diuresis





Indeterminate Troponin


- Trend was negative





NABILA likely prerenal - resolved


- Cr 1.13 -> 0.91 -> 0.84


- Continue to monitor with daily BMP


- Avoid nephrotoxic medications





IDDMII


- Continue home Lantus 45U qAM, pt prev on Metformin in addition but stopped 

during recent hospitalization likely due to kidney function


- CC diet


- ACHS accuchecks, hypoglycemic protocol


- Ordered A1c


- Expect elevated glucose levels d/t steroid use





Normocytic Anemia


- Hgb 11.8





Hypothyroidism


- Recently diagnosed, started medications 6/29


- TSH and FT4 is nml on admission





Constipation


- Will start Miralax and Colace TACO





Leukocytosis


- Steroid vs cutaneous infection


- Trend WBC


- Levaquin for positive cutaneous cultures





Hyponatremia - resolved


- 134 -> 138





Bullous Pemphigoid with secondary infection


- Wound care consulted


- Continue steroid taper


- Start Levoquin 500IV daily, abx course 7-10 days





HTN


- Continue home meds





HLD


- Continue home meds





Dispo:


Stable, gradually improving


LOS: Expect 2-3 additional days





Addendum - Attending





- Attending Attestation


Date/Time: 07/03/19 8071





I personally evaluated the patient and discussed the management with Dr. Andrews.


I agree with the History, Examination, Assessment and Plan documented above 

with any addition or exceptions noted below.


Pt had SVT requiring adenosine yesterday.  She has had a few more runs of svt 

overnight and this morning.  Pt is feeling week.  Echo shows diastolic 

dysfunction.  Continue diuresis.  Wean O2 as tolerated.  Consulting cardiology.

## 2019-07-03 NOTE — CON
DATE OF CONSULTATION:  



REASON FOR CONSULTATION:  SVT.



HISTORY OF PRESENT ILLNESS:  Ms. Nunes is a pleasant 86-year-old woman with no

previous history of underlying coronary artery disease.  __________ due to recent

[place].  She developed SVT and was symptomatic.  Heart rate was in the 180s.

She became hypotensive.  She then converted after about 30 minutes.  She does

continue to have intermittent episodes of SVT. 



PAST MEDICAL HISTORY:  Bullous pemphigoid, diabetes mellitus, hyperlipidemia ,

hypertension, hypothyroidism. 



SOCIAL HISTORY:  No current tobacco or alcohol use.



PAST SURGICAL HISTORY:  None.



REVIEW OF SYSTEMS:  A 10-point review of systems is reviewed as above, otherwise

negative. 



PHYSICAL EXAMINATION:

GENERAL:  Patient is a pleasant woman who is in no acute distress.  The patient

appears their stated age. 

VITAL SIGNS:  Blood pressure 132/68, pulse 89, temperature afebrile. 

NEUROLOGIC:  The patient is alert and oriented x3 with no focal neurologic deficits. 

HEENT:  Sclerae without icterus.  Mouth has moist mucous membranes with normal

pallor. 

NECK:  No JVD.  Carotid upstroke brisk.  No bruits bilaterally. 

LUNGS:  Clear to auscultation with unlabored respirations. 

BACK:  No scoliosis or kyphosis. 

CARDIAC:  Regular rate and rhythm with normal S1 and S2.  No S3 or S4 noted.  No

significant rubs, murmurs, thrills, or gallops noted throughout the precordium.  PMI

is not displaced.  There is no parasternal heave. 

ABDOMEN:  Soft, nontender, nondistended.  No peritoneal signs present.  No

hepatosplenomegaly.  No abnormal striae. 

EXTREMITIES:  2+ femoral and 2+ dorsalis pedis pulses.  No cyanosis, clubbing, or

edema. 

SKIN: Bullous pemphigoid present on the lateral aspect of the left groin.



PERTINENT LABORATORY DATA:  Hemoglobin 11.8.  Creatinine was 0.4.



IMPRESSION:  Supraventricular tachycardia.



RECOMMENDATIONS:  She will likely benefit from digoxin as well as a calcium-channel

blockade.  I did discuss case with the EP, who agreed to consult with Ms. Nunes.  I

feel she may benefit from ablation and felt to be an appropriate candidate due to

the duration.  In addition, __________ symptoms present with SVT.  We will defer any

further recommendations per Dr. Sander Oswald. 







Job ID:  [id number]

## 2019-07-04 LAB
ANION GAP SERPL CALC-SCNC: 12 MMOL/L (ref 10–20)
BASOPHILS # BLD AUTO: 0 THOU/UL (ref 0–0.2)
BASOPHILS NFR BLD AUTO: 0.4 % (ref 0–1)
BUN SERPL-MCNC: 13 MG/DL (ref 9.8–20.1)
CALCIUM SERPL-MCNC: 8.5 MG/DL (ref 7.8–10.44)
CHLORIDE SERPL-SCNC: 103 MMOL/L (ref 98–107)
CO2 SERPL-SCNC: 27 MMOL/L (ref 23–31)
CREAT CL PREDICTED SERPL C-G-VRATE: 76 ML/MIN (ref 70–130)
EOSINOPHIL # BLD AUTO: 0.2 THOU/UL (ref 0–0.7)
EOSINOPHIL NFR BLD AUTO: 1.5 % (ref 0–10)
GLUCOSE SERPL-MCNC: 55 MG/DL (ref 83–110)
HGB BLD-MCNC: 12 G/DL (ref 12–16)
LYMPHOCYTES # BLD: 2.7 THOU/UL (ref 1.2–3.4)
LYMPHOCYTES NFR BLD AUTO: 25.5 % (ref 21–51)
MCH RBC QN AUTO: 31.2 PG (ref 27–31)
MCV RBC AUTO: 98.6 FL (ref 78–98)
MONOCYTES # BLD AUTO: 0.7 THOU/UL (ref 0.11–0.59)
MONOCYTES NFR BLD AUTO: 6.5 % (ref 0–10)
NEUTROPHILS # BLD AUTO: 7 THOU/UL (ref 1.4–6.5)
NEUTROPHILS NFR BLD AUTO: 66 % (ref 42–75)
PLATELET # BLD AUTO: 230 THOU/UL (ref 130–400)
POTASSIUM SERPL-SCNC: 3.3 MMOL/L (ref 3.5–5.1)
RBC # BLD AUTO: 3.85 MILL/UL (ref 4.2–5.4)
SODIUM SERPL-SCNC: 139 MMOL/L (ref 136–145)
WBC # BLD AUTO: 10.7 THOU/UL (ref 4.8–10.8)

## 2019-07-04 RX ADMIN — Medication PRN ML: at 12:24

## 2019-07-04 RX ADMIN — ASPIRIN SCH MG: 81 TABLET ORAL at 08:32

## 2019-07-04 RX ADMIN — Medication SCH ML: at 20:56

## 2019-07-04 RX ADMIN — HYDROCODONE BITARTRATE AND ACETAMINOPHEN PRN TAB: 5; 325 TABLET ORAL at 12:20

## 2019-07-04 RX ADMIN — Medication SCH ML: at 08:33

## 2019-07-04 RX ADMIN — INSULIN LISPRO PRN UNITS: 100 INJECTION, SOLUTION INTRAVENOUS; SUBCUTANEOUS at 21:00

## 2019-07-04 RX ADMIN — INSULIN LISPRO PRN UNIT: 100 INJECTION, SOLUTION INTRAVENOUS; SUBCUTANEOUS at 12:21

## 2019-07-04 RX ADMIN — INSULIN GLARGINE SCH MLS: 100 INJECTION, SOLUTION SUBCUTANEOUS at 08:59

## 2019-07-04 RX ADMIN — INSULIN LISPRO PRN UNIT: 100 INJECTION, SOLUTION INTRAVENOUS; SUBCUTANEOUS at 17:46

## 2019-07-04 NOTE — PDOC.FM
- Subjective


Subjective: 





Pt states she is feeling well this morning. She had one "episode" of feeling 

dizzy and uneasy when the nurses were changing her IV. Telemetry showed in 

increased amount of SVT during this time, longest run lasting 7 seconds. Pt was 

seen by cardiology and EP yesterday who plan to attempt medication changes 

through the weekend and proceed with an ablation come Monday, 7/8, if no 

improvement.





- Objective


MAR Reviewed: Yes


Vital Signs & Weight: 


 Vital Signs (12 hours)











  Temp Pulse Resp BP Pulse Ox


 


 07/04/19 03:07  97.6 F  69  18  135/62  98


 


 07/03/19 23:45   61   127/67 


 


 07/03/19 20:39  98.5 F  80  18  123/75  98








 Weight











Admit Weight                   92.306 kg


 


Weight                         89.811 kg














I&O: 


 











 07/03/19 07/04/19 07/05/19





 06:59 06:59 06:59


 


Intake Total 530 170 


 


Output Total 1150 700 


 


Balance -620 -530 











Result Diagrams: 


 07/04/19 04:48





 07/04/19 04:48





Phys Exam





- Physical Examination


Constitutional: NAD


HEENT: PERRLA, moist MMs


Neck: full ROM


Respiratory: no wheezing


very fine crackles in bilateral bases


Cardiovascular: RRR, no significant murmur


Gastrointestinal: soft, non-tender


Musculoskeletal: pulses present, edema present (trace edema bilaterally)


Neurological: non-focal, moves all 4 limbs


Psychiatric: normal affect, A&O x 3


Deviation from normal: Healing bullous lesions





Dx/Plan


(1) Supraventricular tachycardia, paroxysmal


Code(s): I47.1 - SUPRAVENTRICULAR TACHYCARDIA   Status: Acute   





(2) Acute respiratory failure with hypoxia


Code(s): J96.01 - ACUTE RESPIRATORY FAILURE WITH HYPOXIA   Status: Acute   





(3) Bullous pemphigoid


Code(s): L12.0 - BULLOUS PEMPHIGOID   Status: Acute   





(4) HTN (hypertension)


Code(s): I10 - ESSENTIAL (PRIMARY) HYPERTENSION   Status: Acute   





(5) Insulin dependent diabetes mellitus


Code(s): E11.9 - TYPE 2 DIABETES MELLITUS WITHOUT COMPLICATIONS; Z79.4 - LONG 

TERM (CURRENT) USE OF INSULIN   Status: Acute   





(6) Leukocytosis


Code(s): D72.829 - ELEVATED WHITE BLOOD CELL COUNT, UNSPECIFIED   Status: Acute

   





- Plan


Plan: 





Paroxysmal SVT, previously Afib RVR


- Reports no hx of afib or arrhythmia


- Volume overloaded with edema, elevated BNP, orthopnea with new O2 requirement 

- improving with diuresis


- Currently having short runs of SVT intermittently, exacerbates with movement/

pain


- Echo: EF 60-65, 1/3 diastolic dysfunction, reduced RV systolic function


- Continue tele


- Cardiology and EP: Spoke w/ pt, agrees to medication trial through weekend 

and if not effective plan ablation 7/8 





Acute hypoxic respiratory failure likely 2/2 pulm congestion related to Afib 

with RVR/CHF


- Continue O2, wean as tolerated


- Should improve with rate control and diuresis





Congestive heart failure


- likely diagnosis considering new O2 requirements, orthopnea, and peripheral 

edema


- could be cause of new onset Afib/SVT


- Echo as above


- will continue diuresis, change IV lasix to oral





Indeterminate Troponin


- Trend was negative





NABILA likely prerenal - resolved


- Cr 1.13 -> 0.91 -> 0.84 -> 0.77


- Continue to monitor with daily BMP


- Avoid nephrotoxic medications





IDDMII


- Continue home Lantus 45U qAM, pt prev on Metformin in addition but stopped 

during recent hospitalization likely due to kidney function


- CC diet


- ACHS accuchecks, hypoglycemic protocol


- Ordered A1c


- Expect elevated glucose levels d/t steroid use





Normocytic Anemia


- Hgb 11.8





Hypothyroidism


- Recently diagnosed, started medications 6/29


- TSH and FT4 is nml on admission





Constipation


- Will start Miralax and Colace TACO





Leukocytosis - Resolved 7/4


- Steroid vs cutaneous infection


- Levaquin for positive cutaneous cultures





Hyponatremia - resolved


- 134 -> 138





Bullous Pemphigoid with secondary infection


- Wound care consulted


- Continue steroid taper


- Start Levoquin 500IV daily, abx course 7-10 days





HTN


- Continue home meds





HLD


- Continue home meds





Hypokalemia


- Likely secondary to lasix, replacing with oral supplementation 





Dispo:


Stable, gradually improving


LOS: Expect 4-6 additional days





Addendum - Attending





- Attending Attestation


Date/Time: 07/04/19 2463





I personally evaluated the patient and discussed the management with Dr. Andrews.


I agree with the History, Examination, Assessment and Plan documented above 

with any addition or exceptions noted below.


The patient's lower extremity edema is improved will transition from IV to po 

lasix.  The patient continues to have runs of svt.  Will cocntinue to adjust 

verapamil.  The patient is significantly deconditioned.  Continue therapy.

## 2019-07-04 NOTE — PDOC.CTH
Cardiology Progress Note





- Subjective





The pt seen and examined.  No overnight events.  No cardiac complaints. She had 

dizziness when she was exercising with PT this AM.  Her tele showed 9 beats of 

PATs per the tele record.   Per tele record, she has not had SVT today.





- Objective


 Vital Signs











  Temp Pulse Pulse Pulse Resp BP BP


 


 07/04/19 16:00       


 


 07/04/19 12:00  98.2 F  75    20  


 


 07/04/19 11:00    91  84    115/58 L


 


 07/04/19 08:31   63     135/60 


 


 07/04/19 08:00       


 


 07/04/19 07:54  97.6 F  63    18  














  BP BP Pulse Ox Pulse Ox Pulse Ox


 


 07/04/19 16:00    94 L  


 


 07/04/19 12:00   103/55 L  93 L  


 


 07/04/19 11:00  128/65    98  98


 


 07/04/19 08:31     


 


 07/04/19 08:00    99  


 


 07/04/19 07:54   135/60  99  








 











Admit Weight                   203 lb 8 oz


 


Weight                         198 lb














 











 07/03/19 07/04/19 07/05/19





 06:59 06:59 06:59


 


Intake Total 530 170 840


 


Output Total 1150 700 


 


Balance -620 -530 840














- Physical Examination


General/Neuro: alert & oriented x3


Neck: no JVD present


Lungs: other: (diminished at bases)


Heart: RRR


Abdomen: soft


Extremities: other: (No edema)





- Telemetry


Telemetry Rhythm: SR; per tele record, she has not had SVT today. 





- Labs


Result Diagrams: 


 07/04/19 04:48





 07/04/19 04:48


 Troponin/CKMB











CK-MB (CK-2)  1.6 ng/mL (0-6.6)   06/30/19  10:22    


 


Troponin I  0.102 ng/mL (< 0.028)  H  06/30/19  16:26    














- Assessment/Plan





1. SVT - Verapamil was increased to 80mg in AM and 180mg in PM; cont. to 

monitor and possible Ablation on 07/08/2019


2. Acute on Chronic diastolic HF - stable with lisinopril and Lasix


3. Pulm HTN with PVSP 50 mmHg - 


4. HTN - stable


5. DM type 2 - 


6. Hypothyroidism - will start Lovothyroxin with home dose


7. Bullous Pemphigoid - On ABX IV, managed by PCP


MAR reviewed





Pt. seen and eval. by me. I agree with the A/P by the NP. We have discussed the 

pt. together.[email or web address]





Review of Systems





- Review of Systems


Constitutional: reports: no symptoms reported


EENTM: reports: no symptoms reported


Respiratory: reports: no symptoms reported


Cardiac (ROS): reports: no symptoms reported


ABD/GI: reports: no symptoms reported


: reports: no symptoms reported


Musculoskeletal: reports: no symptoms reported

## 2019-07-05 LAB
ANION GAP SERPL CALC-SCNC: 18 MMOL/L (ref 10–20)
BASOPHILS # BLD AUTO: 0.1 THOU/UL (ref 0–0.2)
BASOPHILS NFR BLD AUTO: 1.3 % (ref 0–1)
BUN SERPL-MCNC: 15 MG/DL (ref 9.8–20.1)
CALCIUM SERPL-MCNC: 8.4 MG/DL (ref 7.8–10.44)
CHLORIDE SERPL-SCNC: 101 MMOL/L (ref 98–107)
CO2 SERPL-SCNC: 21 MMOL/L (ref 23–31)
CREAT CL PREDICTED SERPL C-G-VRATE: 66 ML/MIN (ref 70–130)
EOSINOPHIL # BLD AUTO: 0.2 THOU/UL (ref 0–0.7)
EOSINOPHIL NFR BLD AUTO: 2 % (ref 0–10)
GLUCOSE SERPL-MCNC: 93 MG/DL (ref 83–110)
HGB BLD-MCNC: 12.5 G/DL (ref 12–16)
LYMPHOCYTES # BLD: 2.6 THOU/UL (ref 1.2–3.4)
LYMPHOCYTES NFR BLD AUTO: 24.6 % (ref 21–51)
MCH RBC QN AUTO: 32.6 PG (ref 27–31)
MCV RBC AUTO: 99 FL (ref 78–98)
MONOCYTES # BLD AUTO: 0.6 THOU/UL (ref 0.11–0.59)
MONOCYTES NFR BLD AUTO: 5.7 % (ref 0–10)
NEUTROPHILS # BLD AUTO: 7.1 THOU/UL (ref 1.4–6.5)
NEUTROPHILS NFR BLD AUTO: 66.5 % (ref 42–75)
PLATELET # BLD AUTO: 255 THOU/UL (ref 130–400)
POTASSIUM SERPL-SCNC: 4 MMOL/L (ref 3.5–5.1)
RBC # BLD AUTO: 3.84 MILL/UL (ref 4.2–5.4)
SODIUM SERPL-SCNC: 135 MMOL/L (ref 136–145)
TROPONIN I SERPL DL<=0.01 NG/ML-MCNC: 0.03 NG/ML (ref ?–0.03)
WBC # BLD AUTO: 10.7 THOU/UL (ref 4.8–10.8)

## 2019-07-05 RX ADMIN — DOCUSATE SODIUM 50 MG AND SENNOSIDES 8.6 MG SCH TAB: 8.6; 5 TABLET, FILM COATED ORAL at 20:59

## 2019-07-05 RX ADMIN — ASPIRIN SCH MG: 81 TABLET ORAL at 09:17

## 2019-07-05 RX ADMIN — INSULIN LISPRO PRN UNIT: 100 INJECTION, SOLUTION INTRAVENOUS; SUBCUTANEOUS at 16:22

## 2019-07-05 RX ADMIN — INSULIN LISPRO PRN UNIT: 100 INJECTION, SOLUTION INTRAVENOUS; SUBCUTANEOUS at 11:27

## 2019-07-05 RX ADMIN — INSULIN GLARGINE SCH MLS: 100 INJECTION, SOLUTION SUBCUTANEOUS at 09:18

## 2019-07-05 RX ADMIN — INSULIN LISPRO PRN UNITS: 100 INJECTION, SOLUTION INTRAVENOUS; SUBCUTANEOUS at 21:02

## 2019-07-05 RX ADMIN — Medication SCH ML: at 09:20

## 2019-07-05 RX ADMIN — Medication PRN ML: at 11:28

## 2019-07-05 RX ADMIN — Medication PRN ML: at 09:19

## 2019-07-05 RX ADMIN — HYDROCODONE BITARTRATE AND ACETAMINOPHEN PRN TAB: 5; 325 TABLET ORAL at 21:47

## 2019-07-05 RX ADMIN — HYDROCODONE BITARTRATE AND ACETAMINOPHEN PRN TAB: 5; 325 TABLET ORAL at 13:51

## 2019-07-05 RX ADMIN — Medication SCH ML: at 20:58

## 2019-07-05 NOTE — PDOC.CTH
Cardiology Progress Note





- Subjective





The pt was seen and examined this AM.  No overnight events.  She was complains 

of sudden fatigue with 9 beats of PATs around 0900.  Other wise, she denied any 

other cardiac complaints. 





- Objective


 Vital Signs











  Temp Pulse Resp BP BP Pulse Ox


 


 07/05/19 13:45   89    


 


 07/05/19 13:00       98


 


 07/05/19 12:00  96.3 F L  89  18   100/58 L  97


 


 07/05/19 09:17   80   137/58 L  


 


 07/05/19 08:00  97.1 F L  80  16   137/58 L  97


 


 07/05/19 03:10  97.8 F  96  20   124/67  97








 











Admit Weight                   203 lb 8 oz


 


Weight                         196 lb 11.2 oz














 











 07/04/19 07/05/19 07/06/19





 06:59 06:59 06:59


 


Intake Total 170 2910 800


 


Output Total 700 2050 700


 


Balance -530 860 100














- Physical Examination


General/Neuro: alert & oriented x3


Neck: no JVD present


Lungs: CTA


Heart: RRR


Abdomen: soft


Extremities: other: (No edema)





- Telemetry


Telemetry Rhythm: SR with frequent PACs





- Labs


Result Diagrams: 


 07/05/19 06:02





 07/05/19 06:02


 Troponin/CKMB











CK-MB (CK-2)  1.6 ng/mL (0-6.6)   06/30/19  10:22    


 


Troponin I  0.034 ng/mL (< 0.028)  H  07/05/19  12:21    














- Assessment/Plan





1. SVT - On Verapamil 80mg in AM and 180mg in PM; cont. to monitor and possible 

Ablation on 07/08/2019


2. Acute on Chronic diastolic HF - stable with lisinopril and Lasix


3. Pulm HTN with PVSP 50 mmHg - 


4. HTN - stable


5. DM type 2 - 


6. Hypothyroidism - will start Lovothyroxin with home dose


7. Bullous Pemphigoid - On ABX IV, managed by PCP


MAR reviewed





* Dr Hyatt pt





Pt. seen and eval. by me.She continues to have runs of SVT. Plan for ablation 

on Monday. C/O of significant pain with her skin lesions.Chest clear. RRR 

alternating with rapid SVT. She was transferred to CCU due to the continued SVT 

and hypotension. Continue IV dilt. Digoxin added.








Review of Systems





- Review of Systems


Constitutional: reports: see HPI


EENTM: reports: no symptoms reported


Respiratory: reports: no symptoms reported


Cardiac (ROS): reports: no symptoms reported


ABD/GI: reports: no symptoms reported


: reports: no symptoms reported

## 2019-07-05 NOTE — PDOC.FM
- Subjective


Subjective: 





Pt states she is feeling almost back to her normal self today. Yesterday a 

couple hours after receiving her morning medication she states she felt like 

her energy level returned to baseline. She was weaned off of her oxygen and 

denies experiencing any instances of SOB on RA, aside from some mild dyspnea 

during therapy.





- Objective


MAR Reviewed: Yes


Vital Signs & Weight: 


 Vital Signs (12 hours)











  Temp Pulse Resp BP Pulse Ox


 


 07/05/19 03:10  97.8 F  96  20  124/67  97


 


 07/04/19 19:00  97.7 F  76  18  117/60  92 L








 Weight











Admit Weight                   92.306 kg


 


Weight                         89.811 kg














I&O: 


 











 07/03/19 07/04/19 07/05/19





 06:59 06:59 06:59


 


Intake Total 


 


Output Total 1675 138 9853


 


Balance -620 -530 1320











Result Diagrams: 


 07/05/19 06:02





 07/05/19 06:02





Phys Exam





- Physical Examination


Constitutional: NAD


HEENT: PERRLA, moist MMs


Neck: no JVD, full ROM


Respiratory: no wheezing


Fine crackles in right base


Cardiovascular: RRR, no significant murmur


Gastrointestinal: soft, non-tender, positive bowel sounds


Musculoskeletal: no edema, pulses present


Neurological: moves all 4 limbs


Psychiatric: normal affect, A&O x 3


Deviation from normal: Healing bullous lesions





Dx/Plan


(1) Supraventricular tachycardia, paroxysmal


Code(s): I47.1 - SUPRAVENTRICULAR TACHYCARDIA   Status: Acute   





(2) Congestive heart failure with left ventricular diastolic dysfunction


Code(s): I50.30 - UNSPECIFIED DIASTOLIC (CONGESTIVE) HEART FAILURE   Status: 

Acute   


Qualifiers: 


   Congestive heart failure chronicity: acute   Qualified Code(s): I50.31 - 

Acute diastolic (congestive) heart failure   





(3) Bullous pemphigoid


Code(s): L12.0 - BULLOUS PEMPHIGOID   Status: Acute   





(4) HTN (hypertension)


Code(s): I10 - ESSENTIAL (PRIMARY) HYPERTENSION   Status: Acute   


Qualifiers: 


   Hypertension type: essential hypertension   Qualified Code(s): I10 - 

Essential (primary) hypertension   





(5) Insulin dependent diabetes mellitus


Code(s): E11.9 - TYPE 2 DIABETES MELLITUS WITHOUT COMPLICATIONS; Z79.4 - LONG 

TERM (CURRENT) USE OF INSULIN   Status: Acute   





- Plan


Plan: 





Paroxysmal SVT, previously Afib RVR


- Reports no hx of afib or arrhythmia


- Volume overloaded with edema, elevated BNP, orthopnea with new O2 requirement 

- improving with diuresis


- Currently having short runs of SVT intermittently, exacerbates with movement/

pain


- Echo: EF 60-65, 1/3 diastolic dysfunction, reduced RV systolic function


- Continue tele


- Cardiology and EP: Spoke w/ pt, agrees to medication trial through weekend 

and if not effective plan ablation 7/8 


   -Verapamil 80mg AM, 180ER HS


-Will further increase Verapamil ER HS dose today





Acute hypoxic respiratory failure likely 2/2 pulm congestion related to Afib 

with RVR/CHF


- Continue O2, wean as tolerated


- Should improve with rate control and diuresis


- Weaned to RA on 7/4





Congestive heart failure


- likely diagnosis considering new O2 requirements, orthopnea, and peripheral 

edema


- could be cause of new onset Afib/SVT


- Echo as above


- edema resolving


- will continue diuresis, change IV lasix to oral





Indeterminate Troponin


- Trend was negative





NABILA likely prerenal - resolved


- Cr 1.13 -> 0.91 -> 0.84 -> 0.77


- Continue to monitor with daily BMP


- Avoid nephrotoxic medications





IDDMII


- Continue home Lantus 45U qAM, pt prev on Metformin in addition but stopped 

during recent hospitalization likely due to kidney function


- CC diet


- ACHS accuchecks, hypoglycemic protocol


- Ordered A1c


- Expect elevated glucose levels d/t steroid use





Normocytic Anemia


- Hgb 11.8





Hypothyroidism


- Recently diagnosed, started medications 6/29


- TSH and FT4 is nml on admission





Leukocytosis - Resolved 7/4


- Steroid vs cutaneous infection


- Levaquin for positive cutaneous cultures





Hyponatremia - resolved


- 134 -> 138





Bullous Pemphigoid with secondary infection


- Wound care consulted


- Continue steroid taper


- Start Levoquin 500IV daily, abx course 7-10 days





HTN


- Continue home meds





HLD


- Continue home meds





Hypokalemia


- Likely secondary to lasix, replacing with oral supplementation 





Dispo:


Stable, gradually improving


LOS: Expect 3-5 additional days





Addendum - Attending





- Attending Attestation


Date/Time: 07/05/19 7949





I personally evaluated the patient and discussed the management with Dr. Andrews.


I agree with the History, Examination, Assessment and Plan documented above 

with any addition or exceptions noted below.


The patient was reported to have some svt overnight.  She states she was 

feeling much better this morning but is now feeling weak again.  Will adjust 

verapamil.  Pt is likely going to need an ablation and at this time, family is 

in agreement.  Doing well on PO lasix.

## 2019-07-05 NOTE — PDOC.CTH
Cardiology Progress Note





- Subjective





EP progress  note 7/5/19





Pt developed recurrent symptomatic episodes of SVT this am.  No LOC or CP.





- ROS


shortness of breath





- Objective


 Vital Signs











  Temp Pulse Resp BP BP Pulse Ox


 


 07/05/19 13:45   89    


 


 07/05/19 13:00       98


 


 07/05/19 12:00  96.3 F L  89  18   100/58 L  97


 


 07/05/19 09:17   80   137/58 L  


 


 07/05/19 08:00  97.1 F L  80  16   137/58 L  97


 


 07/05/19 03:10  97.8 F  96  20   124/67  97








 











Admit Weight                   203 lb 8 oz


 


Weight                         196 lb 11.2 oz














 











 07/04/19 07/05/19 07/06/19





 06:59 06:59 06:59


 


Intake Total 170 2910 600


 


Output Total 700 2050 300


 


Balance -530 860 300














- Physical Examination


General/Neuro: alert & oriented x3, other: (dyscomfort with sores )


Neck: no JVD present


Lungs: CTA


Heart: RRR


Abdomen: no HSM


Extremities: + edema B (0)





- Telemetry


Telemetry Rhythm: SR, PACs, Parox SVT





- Labs


Result Diagrams: 


 07/05/19 06:02





 07/05/19 06:02


 Troponin/CKMB











CK-MB (CK-2)  1.6 ng/mL (0-6.6)   06/30/19  10:22    


 


Troponin I  0.034 ng/mL (< 0.028)  H  07/05/19  12:21    














- Assessment/Plan





1. Parox SVT.  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  Agree with 

increasing AVN blockers, IV dilt/Dig.  Plan SVT ablation Monday.  Family 

agreeable.


2. Bullous Pemphygoid.

## 2019-07-05 NOTE — PRG
DATE OF SERVICE:  07/05/2019



SUBJECTIVE:  Ms. Bruno is a very pleasant, 86-year-old, white female, who has

been having episodes of SVT.  She had another episode earlier today with rapid

ventricular response.  It was elected to restart a diltiazem drip, and it was

requested that the patient be transferred to ICU for closer monitoring.  There was

no step-down bed, so we went straight to the ICU, although the patient is

hemodynamically stable and alert. 



OBJECTIVE:  GENERAL:  Again, she is awake and alert. 

VITAL SIGNS:  Her blood pressure is 110/70.  Her pulse rate varies between 120 and

145.  Her monitor shows some supraventricular tachycardia and/or a narrow complex

tachycardia that appears to be SVT.  A diltiazem drip has been restarted.

Cardiology was consulted and also suggested digoxin, which we are currently adding.

I would also suggest that we give the patient some more magnesium and then put her

on a maintenance dose of daily or b.i.d. dosing.  Dr. Oswald was also notified and

plans to take the patient for ablation on Monday. 







Job ID:  [id number]

## 2019-07-06 LAB
ANION GAP SERPL CALC-SCNC: 12 MMOL/L (ref 10–20)
BUN SERPL-MCNC: 14 MG/DL (ref 9.8–20.1)
CALCIUM SERPL-MCNC: 8.4 MG/DL (ref 7.8–10.44)
CHLORIDE SERPL-SCNC: 100 MMOL/L (ref 98–107)
CO2 SERPL-SCNC: 29 MMOL/L (ref 23–31)
CREAT CL PREDICTED SERPL C-G-VRATE: 71 ML/MIN (ref 70–130)
GLUCOSE SERPL-MCNC: 52 MG/DL (ref 83–110)
POTASSIUM SERPL-SCNC: 3.9 MMOL/L (ref 3.5–5.1)
SODIUM SERPL-SCNC: 137 MMOL/L (ref 136–145)

## 2019-07-06 RX ADMIN — INSULIN LISPRO PRN UNIT: 100 INJECTION, SOLUTION INTRAVENOUS; SUBCUTANEOUS at 17:10

## 2019-07-06 RX ADMIN — INSULIN LISPRO PRN UNITS: 100 INJECTION, SOLUTION INTRAVENOUS; SUBCUTANEOUS at 21:27

## 2019-07-06 RX ADMIN — DOCUSATE SODIUM 50 MG AND SENNOSIDES 8.6 MG SCH TAB: 8.6; 5 TABLET, FILM COATED ORAL at 21:20

## 2019-07-06 RX ADMIN — DOCUSATE SODIUM 50 MG AND SENNOSIDES 8.6 MG SCH TAB: 8.6; 5 TABLET, FILM COATED ORAL at 09:03

## 2019-07-06 RX ADMIN — HYDROCODONE BITARTRATE AND ACETAMINOPHEN PRN TAB: 5; 325 TABLET ORAL at 19:06

## 2019-07-06 RX ADMIN — INSULIN GLARGINE SCH MLS: 100 INJECTION, SOLUTION SUBCUTANEOUS at 09:01

## 2019-07-06 RX ADMIN — ASPIRIN SCH MG: 81 TABLET ORAL at 09:02

## 2019-07-06 RX ADMIN — Medication SCH: at 09:44

## 2019-07-06 RX ADMIN — Medication SCH ML: at 21:20

## 2019-07-06 NOTE — PDOC.FM
- Subjective


Subjective: 





NAEO in the CCU. Patient remained stable on cardizem drip overnight. Reports 

feeling well this AM.  





- Objective


MAR Reviewed: Yes


Vital Signs & Weight: 


 Vital Signs (12 hours)











  Pulse Pulse Ox


 


 07/06/19 02:31  89 


 


 07/05/19 20:01  89 


 


 07/05/19 19:05   99








 Weight











Admit Weight                   92.306 kg


 


Weight                         88.541 kg











 Most Recent Monitor Data











Heart Rate from ECG            58


 


NIBP                           116/51


 


NIBP BP-Mean                   72


 


Respiration from ECG           16


 


SpO2                           98














I&O: 


 











 07/04/19 07/05/19 07/06/19





 06:59 06:59 06:59


 


Intake Total 170 2910 1100.7


 


Output Total 700 2050 2050


 


Balance -530 860 -949.3











Result Diagrams: 


 07/05/19 06:02





 07/06/19 06:10





Phys Exam





- Physical Examination


Constitutional: NAD


HEENT: moist MMs


Neck: supple, full ROM


Respiratory: no wheezing, no rales, no rhonchi, clear to auscultation bilateral


Cardiovascular: no significant murmur


bradycardic but regular rhythm


Musculoskeletal: pulses present


trace edema in B/L LEs w/ SCDs in place


Neurological: non-focal, moves all 4 limbs


Psychiatric: normal affect, A&O x 3





Dx/Plan


(1) Congestive heart failure with left ventricular diastolic dysfunction


Code(s): I50.30 - UNSPECIFIED DIASTOLIC (CONGESTIVE) HEART FAILURE   Status: 

Acute   


Qualifiers: 


   Congestive heart failure chronicity: acute   Qualified Code(s): I50.31 - 

Acute diastolic (congestive) heart failure   





(2) Supraventricular tachycardia, paroxysmal


Code(s): I47.1 - SUPRAVENTRICULAR TACHYCARDIA   Status: Acute   





(3) Bullous pemphigoid


Code(s): L12.0 - BULLOUS PEMPHIGOID   Status: Acute   





(4) HLD (hyperlipidemia)


Code(s): E78.5 - HYPERLIPIDEMIA, UNSPECIFIED   Status: Acute   





(5) HTN (hypertension)


Code(s): I10 - ESSENTIAL (PRIMARY) HYPERTENSION   Status: Acute   


Qualifiers: 


   Hypertension type: essential hypertension   Qualified Code(s): I10 - 

Essential (primary) hypertension   





(6) Hypothyroidism


Code(s): E03.9 - HYPOTHYROIDISM, UNSPECIFIED   Status: Acute   





(7) Insulin dependent diabetes mellitus


Code(s): E11.9 - TYPE 2 DIABETES MELLITUS WITHOUT COMPLICATIONS; Z79.4 - LONG 

TERM (CURRENT) USE OF INSULIN   Status: Acute   





(8) Normocytic anemia


Code(s): D64.9 - ANEMIA, UNSPECIFIED   Status: Acute   





- Plan


Plan: 





SVT, previously Afib RVR


- Reports no hx of afib or arrhythmia


- s/p a run of sustained SVT with a HR in the 170s with concommitant 

hypotension requiring restarting cardizem drip (per EP recs) and transfer to 

the CCU. 


- Cardiology and EP on board. Appreciate recs. Will continue on cardizem drip 

with plans to an ablation on Monday.


- Will continue Verapamil 80mg PO QAM.


- Will continue to monitor HR and BPs closely. 





Congestive heart failure


- Echo: EF 60-65, 1/3 diastolic dysfunction, reduced RV systolic function


- Edema resolving with QD diuresis & now satting well on RA





Indeterminate Troponin


- Trend was negative & repeat yesterday also lower than initial trops





NABILA


- Cr level pending for this AM.


- Continue to monitor with daily BMPs


- Avoid nephrotoxic medications





Hypokalemia


- Likely secondary to lasix. Will replace with oral supplementation PRN 





IDDMII


- Continue home Lantus 45U qAM & consider resuming Metformin once kidney 

function has returned to baseline. 


- CC diet


- ACHS accuchecks, hypoglycemic protocol





Normocytic Anemia


- Hgb 11.8 on admission but up to ~12 yesterday. Will continue to monitor PRN 

while on lovenox in the hospital. 





Hypothyroidism


- Will continue synthroid; however, labs WNLs on admission after only 2 doses. 

Will need close outpatient follow-up.





Acute hypoxic respiratory failure likely 2/2 pulm congestion related to Afib 

with RVR/CHF, resolved


- Weaned to RA again this AM.





Leukocytosis 


- Resolved on 7/4





Hyponatremia - resolved


- Na pending for this AM.





Bullous Pemphigoid with secondary infection


- Wound care on board.


- Will continue steroid taper


- Will continue Levaquin 500mg daily for a total of 10 days





HTN


- Continue home meds





HLD


- Continue home meds





Dispo: Expect 3-5 additional days as patient will need to be monitored for at 

least 24 hours s/p ablation on 7/8.

## 2019-07-06 NOTE — EKG
Test Reason : DIZZINESS

Blood Pressure : ***/*** mmHG

Vent. Rate : 186 BPM     Atrial Rate : 192 BPM

   P-R Int : 000 ms          QRS Dur : 088 ms

    QT Int : 254 ms       P-R-T Axes : 000 014 219 degrees

   QTc Int : 447 ms

 

Supraventricular tachycardia

Marked ST abnormality, possible inferolateral subendocardial injury

Abnormal ECG

 

Confirmed by SAMRA CUMMINS ([id number]),  JODY COLEMAN ([id number]) on 7/6/2019 9:10:00 PM

 

Referred By:             Confirmed By:SAMRA CUMMINS

## 2019-07-06 NOTE — PRG
DATE OF SERVICE:  07/06/2019



I have discussed the case with Dr. Dara Quinones and agree with her assessment and

plan. 



Ms. Bruno this morning is awake, alert, very pleasant, talkative, in no

distress.  Her current pulse rate is 90 and her MAP is greater than 80.  She is

still on the low dose diltiazem drip and we are awaiting the ablation, which is to

be done on Monday. 







Job ID:  [id number]

## 2019-07-07 LAB
ANION GAP SERPL CALC-SCNC: 14 MMOL/L (ref 10–20)
BUN SERPL-MCNC: 16 MG/DL (ref 9.8–20.1)
CALCIUM SERPL-MCNC: 9.1 MG/DL (ref 7.8–10.44)
CHLORIDE SERPL-SCNC: 97 MMOL/L (ref 98–107)
CO2 SERPL-SCNC: 27 MMOL/L (ref 23–31)
CREAT CL PREDICTED SERPL C-G-VRATE: 68 ML/MIN (ref 70–130)
GLUCOSE SERPL-MCNC: 117 MG/DL (ref 83–110)
POTASSIUM SERPL-SCNC: 4.4 MMOL/L (ref 3.5–5.1)
SODIUM SERPL-SCNC: 134 MMOL/L (ref 136–145)

## 2019-07-07 RX ADMIN — Medication SCH ML: at 21:11

## 2019-07-07 RX ADMIN — DOCUSATE SODIUM 50 MG AND SENNOSIDES 8.6 MG SCH TAB: 8.6; 5 TABLET, FILM COATED ORAL at 21:03

## 2019-07-07 RX ADMIN — INSULIN LISPRO PRN UNIT: 100 INJECTION, SOLUTION INTRAVENOUS; SUBCUTANEOUS at 11:23

## 2019-07-07 RX ADMIN — ASPIRIN SCH MG: 81 TABLET ORAL at 08:48

## 2019-07-07 RX ADMIN — Medication SCH ML: at 08:50

## 2019-07-07 RX ADMIN — DOCUSATE SODIUM 50 MG AND SENNOSIDES 8.6 MG SCH TAB: 8.6; 5 TABLET, FILM COATED ORAL at 08:48

## 2019-07-07 RX ADMIN — INSULIN LISPRO PRN UNIT: 100 INJECTION, SOLUTION INTRAVENOUS; SUBCUTANEOUS at 16:39

## 2019-07-07 RX ADMIN — INSULIN GLARGINE SCH MLS: 100 INJECTION, SOLUTION SUBCUTANEOUS at 09:04

## 2019-07-07 NOTE — PRG
DATE OF SERVICE:  07/07/2019



Des is doing fine this morning.  She is however still having episodes of SVT with

rapid ventricular response of the heart rate to 140.  She is on a diltiazem drip.

We will continue to monitor and told her an ablation session tomorrow with Dr. Oswald.

 Hemodynamically, other than the rapid heart rate, she is stable, maintaining a

normal blood pressure. 







Job ID:  [id number]

## 2019-07-07 NOTE — PDOC.FM
- Subjective


Subjective: 





NAEO. Patient reports feeling well this AM. Denies any chest pain, SOB, or 

recurrent dizzy spells yesterday. 





- Objective


MAR Reviewed: Yes


Vital Signs & Weight: 


 Weight











Admit Weight                   92.306 kg


 


Weight                         90.31 kg











 Most Recent Monitor Data











Heart Rate from ECG            80


 


NIBP                           147/60


 


NIBP BP-Mean                   89


 


Respiration from ECG           17


 


SpO2                           96














I&O: 


 











 07/05/19 07/06/19 07/07/19





 06:59 06:59 06:59


 


Intake Total 2910 1301.5 2010


 


Output Total 2050 2250 2790


 


Balance 860 -948.5 -780











Result Diagrams: 


 07/05/19 06:02





 07/07/19 04:18





Phys Exam





- Physical Examination


Constitutional: NAD


HEENT: moist MMs


Neck: supple, full ROM


Respiratory: no wheezing, no rales, no rhonchi, clear to auscultation bilateral


Cardiovascular: RRR, no significant murmur


Musculoskeletal: no edema, pulses present


Neurological: non-focal, moves all 4 limbs


Psychiatric: normal affect, A&O x 3


Skin: no rash, normal turgor





Dx/Plan


(1) Congestive heart failure with left ventricular diastolic dysfunction


Code(s): I50.30 - UNSPECIFIED DIASTOLIC (CONGESTIVE) HEART FAILURE   Status: 

Chronic   


Qualifiers: 


   Congestive heart failure chronicity: acute   Qualified Code(s): I50.31 - 

Acute diastolic (congestive) heart failure   





(2) Supraventricular tachycardia, paroxysmal


Code(s): I47.1 - SUPRAVENTRICULAR TACHYCARDIA   Status: Acute   





(3) Bullous pemphigoid


Code(s): L12.0 - BULLOUS PEMPHIGOID   Status: Acute   





(4) HLD (hyperlipidemia)


Code(s): E78.5 - HYPERLIPIDEMIA, UNSPECIFIED   Status: Acute   





(5) HTN (hypertension)


Code(s): I10 - ESSENTIAL (PRIMARY) HYPERTENSION   Status: Acute   


Qualifiers: 


   Hypertension type: essential hypertension   Qualified Code(s): I10 - 

Essential (primary) hypertension   





(6) Hypothyroidism


Code(s): E03.9 - HYPOTHYROIDISM, UNSPECIFIED   Status: Acute   





(7) Insulin dependent diabetes mellitus


Code(s): E11.9 - TYPE 2 DIABETES MELLITUS WITHOUT COMPLICATIONS; Z79.4 - LONG 

TERM (CURRENT) USE OF INSULIN   Status: Acute   





(8) Normocytic anemia


Code(s): D64.9 - ANEMIA, UNSPECIFIED   Status: Acute   





- Plan


Plan: 





SVT, previously Afib RVR


- Reports no hx of afib or arrhythmia


- Patient remained stable on the dilt drip yesterday. 


- Cardiology and EP on board. Appreciate recs. Will continue on cardizem drip 

with plans for an ablation tomorrow.


- Will continue Verapamil 80mg PO QAM.


- Will continue to monitor HR and BPs closely. 





Congestive heart failure


- Echo: EF 60-65, 1/3 diastolic dysfunction, reduced RV systolic function


- Edema resolving with QD diuresis & now satting well on RA





NABILA


- Cr 0.83 this AM w/ an eGFR of 65. Suspect possible underlying CKD of at least 

2


- Continue to monitor with daily BMPs


- Avoid nephrotoxic medications





Hyponatremia 


- Na down to 134 this AM. Patient asymptomatic. Will continue to monitor





IDDMII


- Will decrease lantus to 40U qAM & due to persistent hypoglycemia in the AM


- CC diet


- ACHS accuchecks, hypoglycemic protocol





Normocytic Anemia


- Hgb 11.8 on admission but up to ~12 yesterday. Will continue to monitor PRN 

while on lovenox in the hospital. 





Hypothyroidism


- Will continue synthroid; however, labs WNLs on admission after only 2 doses. 

Will need close outpatient follow-up.





Acute hypoxic respiratory failure likely 2/2 pulm congestion related to Afib 

with RVR/CHF, resolved


- Stable on RA since yesterday AM.





Leukocytosis 


- Resolved on 7/4





Bullous Pemphigoid with secondary infection


- Wound care on board.


- Will continue steroid taper


- Will continue Levaquin 500mg daily for a total of 10 days





Hypokalemia, resolved


- K 4.4 this AM. Will replace with oral supplementation PRN 





HTN


- Continue home meds





HLD


- Continue home meds





Indeterminate Troponin


- Trend was negative & repeat yesterday also lower than initial trops





Dispo: Expect ~2-3 additional days as patient will need to be monitored for at 

least 24 hours s/p ablation on 7/8.

## 2019-07-07 NOTE — PRG
DATE OF SERVICE:  07/07/2019



SUBJECTIVE:  Ms. Bruno's rates in the 80s.  She is in no distress.  She is

afebrile. 



OBJECTIVE:  VITAL SIGNS:  Blood pressure is 99/45, respiratory rates in the teens. 

HEAD AND NECK:  Unchanged. 

LUNGS:  Clear. 

HEART:  Regular rhythm with intermittent irregular beats. 

ABDOMEN:  Soft and nontender. 

EXTREMITIES:  Without clubbing, cyanosis, or edema.



LABORATORY DATA:  There is no new CBC.  Sodium 134, potassium 4.4, chloride 97,

bicarb 27, BUN 16, and creatinine 0.83. 



IMPRESSION AND PLAN:  Supraventricular tachycardia, currently stable.  I recommend

she stay in the Critical Care Unit since code green was called when she was on the

telemetry unit after being transferred out of the intermediate care unit. 



Electrophysiology will evaluate her and figure out what the next best step is.  She

is medically stable at this time. 







Job ID:  [id number]

## 2019-07-08 LAB
ANION GAP SERPL CALC-SCNC: 11 MMOL/L (ref 10–20)
BASOPHILS # BLD AUTO: 0 THOU/UL (ref 0–0.2)
BASOPHILS NFR BLD AUTO: 0 % (ref 0–1)
BUN SERPL-MCNC: 18 MG/DL (ref 9.8–20.1)
CALCIUM SERPL-MCNC: 8.8 MG/DL (ref 7.8–10.44)
CHLORIDE SERPL-SCNC: 100 MMOL/L (ref 98–107)
CO2 SERPL-SCNC: 30 MMOL/L (ref 23–31)
CREAT CL PREDICTED SERPL C-G-VRATE: 68 ML/MIN (ref 70–130)
EOSINOPHIL # BLD AUTO: 0.3 THOU/UL (ref 0–0.7)
EOSINOPHIL NFR BLD AUTO: 2.3 % (ref 0–10)
GLUCOSE SERPL-MCNC: 81 MG/DL (ref 83–110)
HGB BLD-MCNC: 13.2 G/DL (ref 12–16)
LYMPHOCYTES # BLD: 3.2 THOU/UL (ref 1.2–3.4)
LYMPHOCYTES NFR BLD AUTO: 21.8 % (ref 21–51)
MCH RBC QN AUTO: 32.1 PG (ref 27–31)
MCV RBC AUTO: 98.2 FL (ref 78–98)
MDIFF COMPLETE?: YES
MONOCYTES # BLD AUTO: 0.8 THOU/UL (ref 0.11–0.59)
MONOCYTES NFR BLD AUTO: 5.5 % (ref 0–10)
NEUTROPHILS # BLD AUTO: 10.4 THOU/UL (ref 1.4–6.5)
NEUTROPHILS NFR BLD AUTO: 70.4 % (ref 42–75)
OVALOCYTES BLD QL SMEAR: (no result) (100X)
PLATELET # BLD AUTO: 303 THOU/UL (ref 130–400)
POTASSIUM SERPL-SCNC: 4 MMOL/L (ref 3.5–5.1)
RBC # BLD AUTO: 4.12 MILL/UL (ref 4.2–5.4)
SODIUM SERPL-SCNC: 137 MMOL/L (ref 136–145)
WBC # BLD AUTO: 14.8 THOU/UL (ref 4.8–10.8)

## 2019-07-08 PROCEDURE — 4A0234Z MEASUREMENT OF CARDIAC ELECTRICAL ACTIVITY, PERCUTANEOUS APPROACH: ICD-10-PCS | Performed by: INTERNAL MEDICINE

## 2019-07-08 PROCEDURE — 065M3ZZ DESTRUCTION OF RIGHT FEMORAL VEIN, PERCUTANEOUS APPROACH: ICD-10-PCS | Performed by: INTERNAL MEDICINE

## 2019-07-08 PROCEDURE — 4A023FZ MEASUREMENT OF CARDIAC RHYTHM, PERCUTANEOUS APPROACH: ICD-10-PCS | Performed by: INTERNAL MEDICINE

## 2019-07-08 RX ADMIN — INSULIN GLARGINE SCH: 100 INJECTION, SOLUTION SUBCUTANEOUS at 08:08

## 2019-07-08 RX ADMIN — Medication SCH ML: at 22:56

## 2019-07-08 RX ADMIN — DOCUSATE SODIUM 50 MG AND SENNOSIDES 8.6 MG SCH TAB: 8.6; 5 TABLET, FILM COATED ORAL at 08:36

## 2019-07-08 RX ADMIN — DOCUSATE SODIUM 50 MG AND SENNOSIDES 8.6 MG SCH TAB: 8.6; 5 TABLET, FILM COATED ORAL at 22:53

## 2019-07-08 RX ADMIN — ASPIRIN SCH MG: 81 TABLET ORAL at 08:35

## 2019-07-08 RX ADMIN — Medication SCH ML: at 08:46

## 2019-07-08 NOTE — PDOC.CTH
Cardiology Progress Note





- Objective


 Vital Signs











  Temp Pulse Pulse Pulse BP BP Pulse Ox


 


 07/08/19 11:00  97.9 F      


 


 07/08/19 08:56    71  85  139/93 H  141/58 H 


 


 07/08/19 08:44   89     


 


 07/08/19 08:36   89     


 


 07/08/19 07:54        97


 


 07/08/19 07:00  97.9 F      








 











Admit Weight                   203 lb 8 oz


 


Weight                         198 lb 11.2 oz














 











 07/07/19 07/08/19 07/09/19





 06:59 06:59 06:59


 


Intake Total 2010 1556 


 


Output Total 2790 8940 


 


Balance -780 -794 














- Labs


Result Diagrams: 


 07/08/19 08:24





 07/08/19 04:32


 Troponin/CKMB











CK-MB (CK-2)  1.6 ng/mL (0-6.6)   06/30/19  10:22    


 


Troponin I  0.034 ng/mL (< 0.028)  H  07/05/19  12:21    














- Assessment/Plan





1. SVT - On Verapamil 80mg in AM and 180mg in PM; cont. to monitor and possible 

Ablation on 07/08/2019


2. Acute on Chronic diastolic HF - stable with lisinopril and Lasix


3. Pulm HTN with PVSP 50 mmHg - 


4. HTN - stable


5. DM type 2 - 


6. Hypothyroidism - will start Lovothyroxin with home dose


7. Bullous Pemphigoid - On ABX IV, managed by PCP


MAR reviewed





* Dr Hyatt pt





Pt. seen and eval. by me.She continues to have runs of SVT. Plan for ablation 

on Monday. C/O of significant pain with her skin lesions.Chest clear. RRR 

alternating with rapid SVT. She was transferred to CCU due to the continued SVT 

and hypotension. Continue IV dilt. Digoxin added.

## 2019-07-08 NOTE — PRG
DATE OF SERVICE:  



Ms. Bruno is sitting quietly in bed, in no distress.  Her heart rate is 62 and

her blood pressure is 124/60.  She is asymptomatic.  She will have ablation later

today per Dr. Oswald. 







Job ID:  [id number]

## 2019-07-08 NOTE — PDOC.FM
- Subjective


Subjective: 





Patient reports she feel well this AM. Felt a little fatigued yesterday but was 

having second runs of SVT even on exam. Denies any SOB or N/V/D.





- Objective


MAR Reviewed: Yes


Vital Signs & Weight: 


 Vital Signs (12 hours)











  Temp Pulse Pulse Pulse BP BP Pulse Ox


 


 07/08/19 08:56    71  85  139/93 H  141/58 H 


 


 07/08/19 08:44   89     


 


 07/08/19 08:36   89     


 


 07/08/19 07:54        97


 


 07/08/19 07:00  97.9 F      








 Weight











Admit Weight                   92.306 kg


 


Weight                         90.129 kg











 Most Recent Monitor Data











Heart Rate from ECG            62


 


NIBP                           148/67


 


NIBP BP-Mean                   94


 


Respiration from ECG           17


 


SpO2                           99














I&O: 


 











 07/07/19 07/08/19 07/09/19





 06:59 06:59 06:59


 


Intake Total 2010 1556 


 


Output Total 0258 3180 


 


Balance -780 -794 











Result Diagrams: 


 07/08/19 08:24





 07/08/19 04:32





Phys Exam





- Physical Examination


Constitutional: NAD


HEENT: moist MMs


Neck: supple, full ROM


Respiratory: no wheezing, no rales, no rhonchi, clear to auscultation bilateral


Cardiovascular: RRR, no significant murmur


Neurological: non-focal, moves all 4 limbs


Psychiatric: normal affect, A&O x 3


Skin: normal turgor





Dx/Plan


(1) Congestive heart failure with left ventricular diastolic dysfunction


Code(s): I50.30 - UNSPECIFIED DIASTOLIC (CONGESTIVE) HEART FAILURE   Status: 

Chronic   


Qualifiers: 


   Congestive heart failure chronicity: acute   Qualified Code(s): I50.31 - 

Acute diastolic (congestive) heart failure   





(2) Supraventricular tachycardia, paroxysmal


Code(s): I47.1 - SUPRAVENTRICULAR TACHYCARDIA   Status: Acute   





(3) Bullous pemphigoid


Code(s): L12.0 - BULLOUS PEMPHIGOID   Status: Acute   





(4) HLD (hyperlipidemia)


Code(s): E78.5 - HYPERLIPIDEMIA, UNSPECIFIED   Status: Acute   





(5) HTN (hypertension)


Code(s): I10 - ESSENTIAL (PRIMARY) HYPERTENSION   Status: Acute   


Qualifiers: 


   Hypertension type: essential hypertension   Qualified Code(s): I10 - 

Essential (primary) hypertension   





(6) Hypothyroidism


Code(s): E03.9 - HYPOTHYROIDISM, UNSPECIFIED   Status: Acute   





(7) Insulin dependent diabetes mellitus


Code(s): E11.9 - TYPE 2 DIABETES MELLITUS WITHOUT COMPLICATIONS; Z79.4 - LONG 

TERM (CURRENT) USE OF INSULIN   Status: Acute   





(8) Normocytic anemia


Code(s): D64.9 - ANEMIA, UNSPECIFIED   Status: Acute   





- Plan


Plan: 





SVT, previously Afib RVR


- Reports no hx of afib or arrhythmia


- Patient remained stable on the dilt drip overnight. 


- Cardiology and EP on board. Appreciate recs. Will continue on cardizem drip 

with plans for an ablation this afternoon.


- Will continue Verapamil 80mg PO QAM.


- Will continue to monitor HR and BPs closely. 





Congestive heart failure


- Echo: EF 60-65, 1/3 diastolic dysfunction, reduced RV systolic function


- Will continue QD diuresis, strict I&Os and HH, fluid restricted diet. 





NABILA


- Renal function stable w/ eGFR in the 60s for the last several days. Suspect 

possible underlying CKD of at least 2.


- Continue to monitor with daily BMPs


- Avoid nephrotoxic medications





IDDMII


- Will continue lantus at 40U qAM due to persistent hypoglycemia in the AMs.


- CC diet


- ACHS accuchecks, hypoglycemic protocol





Normocytic Anemia


- Hgb stable at 12. Will continue to monitor PRN while on lovenox in the 

hospital. 





Hypothyroidism


- Will continue synthroid; however, labs WNLs on admission after only 2 doses. 

Will need close outpatient follow-up.





Acute hypoxic respiratory failure likely 2/2 pulm congestion related to Afib 

with RVR/CHF


- Patient has been requiring O2 via nasal canula since being in the CCU. Could 

be 2/2 arrhythmia but will continue to monitor and consider further workup if 

no improvement after ablation today. 





Leukocytosis 


- WBC slightly elevated at 14.8 this AM but no s/s of infection. Will continue 

to monitor.





Bullous Pemphigoid with secondary infection


- Wound care on board.


- Will continue steroid taper


- Will continue Levaquin 500mg daily for a total of 7 days





Hypokalemia, resolved


- K 4.0 this AM. Will replace with oral supplementation PRN 





HTN


- Continue home meds but will increase lisinopril to 5mg QD due to increased 

pressures over the course of the day yesterday





HLD


- Continue home meds





Indeterminate Troponin


- Trend was negative & repeat yesterday also lower than initial trops





Hyponatremia, resolved 


- Na WNLs at 137 this AM. Will continue to monitor








Dispo: Expect ~2-3 additional days as patient will need to be monitored for at 

least 24 hours s/p ablation on 7/8.

## 2019-07-08 NOTE — PRG
DATE OF SERVICE:  07/08/2019



SUBJECTIVE:  Ms. Des Bruno is scheduled to be evaluated by EP today.



OBJECTIVE:  VITAL SIGNS:  Heart rate is in 80s, oximetry is 97% on 2 L, and blood

pressure is 133/57. 

HEAD AND NECK:  Unchanged. 

LUNGS:  Clear. 

HEART:  Regular rhythm. 

ABDOMEN:  Soft and nontender. 

EXTREMITIES:  Without edema.



LABORATORY DATA:  White count is 14.8, hemoglobin 13.2, and platelets are 303.

Electrolytes are normal.  Intake and outputs -794 mL. 



IMPRESSION:  

1. Supraventricular tachycardia, currently awaiting evaluation by EP.

2. Bullous pemphigoid, not a clinical issue at this point in time.  Once her repeat

evaluation is complete, she can be transferred out of the Critical Care Unit. 







Job ID:  [id number]

## 2019-07-08 NOTE — OP
DATE OF PROCEDURE:  07/08/2019



PROCEDURE PERFORMED:  Electrophysiology study and radiofrequency ablation.



REASON FOR PROCEDURE:  Ms. Bruno is an 86-year-old woman who has history of

highly symptomatic recurrent SVTs, difficult to suppress with digoxin and IV

diltiazem.  She is here for EP study and ablation procedure. 



DESCRIPTION OF PROCEDURE:  The patient received propofol by Anesthesia 
specialist.

After adequate level of sedation achieved, the left and right femoral venous 
area

were prepped, draped, anesthetized using subcutaneous lidocaine and with 
ultrasound

guidance, the veins were cannulated on the left side and a 6 and 8-Latvian sheath

were used to advance an octapolar and decapolar catheter through the right 
atrium,

right ventricle, His bundle and CS location.  A pace mapping and recording were

performed at each location.  Following findings were noted; baseline rhythm was

sinus rhythm at a 1042 milliseconds cycle length, , QRS 82, 

milliseconds,  milliseconds, HV 41 milliseconds.  AV Wenckebach cycle 
length

was 450 milliseconds.  Retrograde Wenckebach cycle length was 420 milliseconds.

Concentric retrograde VA conduction was seen.  AV rufino ERP was measured to be

600/280 milliseconds.  Initially, no definite dual AV rufino physiology was 
seen.  At

baseline, we were not able to induce arrhythmias, but with Isuprel 
administration 4

mcg, we were able to induce a narrow complex SVT at 3 seconds.  Very short VA 
timing

is noted typical for AV rufino reentry tachycardia.  Ventricular burst pacing was

attempted, but due to the poorly sustained nature of the arrhythmia, it was

difficult to entrain from the ventricle.  On the other hand, we were able to 
induce

this arrhythmia with atrial access to my testing after a jump again typical for

AVNRT.  At this point, decision was made to proceed with slow pathway 
modification

on the right femoral vein.  An 8-Latvian short sheath was introduced in a similar

fashion to the left and it was used to obtain 3D map of the right atrium with 
CARTO

system.  His bundle and CS locations were delineated.  Following that, slow 
pathway

modification was performed with the ThermoCool catheter which was used to 
deliver a

total of 5 lesions at 3 minutes and 48 seconds at 30 garces.  Junctional rhythm 
was

noted during the burns following the ablation.  Frequent PACs were still noted 
on

Isuprel, but SVT was not inducible.  The AV Wenckebach changed to 460 
milliseconds

and on Isuprel, it was 300 milliseconds.  No evidence of slow pathway was seen. 



The testing was repeated on Isuprel on and off and no SVT was re-inducible.  At 
end

of the case, cardiac silhouette did not change.  The patient tolerated the 
procedure

well. 



CONCLUSION:  

1. Inducible AV rufino reentry tachycardia.

2. Slow pathway modification eliminating inducibility.

3. Normal AV rufino and His-Purkinje function post ablation.



PLAN:  Continue monitoring for recurrent arrhythmias and taper off AV rufino 
blocking

agents. 







Job ID:  [id number]



[facility]

## 2019-07-09 LAB
ANION GAP SERPL CALC-SCNC: 16 MMOL/L (ref 10–20)
BUN SERPL-MCNC: 16 MG/DL (ref 9.8–20.1)
CALCIUM SERPL-MCNC: 9.1 MG/DL (ref 7.8–10.44)
CHLORIDE SERPL-SCNC: 99 MMOL/L (ref 98–107)
CO2 SERPL-SCNC: 25 MMOL/L (ref 23–31)
CREAT CL PREDICTED SERPL C-G-VRATE: 0 ML/MIN (ref 70–130)
GLUCOSE SERPL-MCNC: 104 MG/DL (ref 83–110)
POTASSIUM SERPL-SCNC: 4.1 MMOL/L (ref 3.5–5.1)
SODIUM SERPL-SCNC: 136 MMOL/L (ref 136–145)

## 2019-07-09 RX ADMIN — INSULIN GLARGINE SCH MLS: 100 INJECTION, SOLUTION SUBCUTANEOUS at 09:46

## 2019-07-09 RX ADMIN — DOCUSATE SODIUM 50 MG AND SENNOSIDES 8.6 MG SCH TAB: 8.6; 5 TABLET, FILM COATED ORAL at 21:03

## 2019-07-09 RX ADMIN — DOCUSATE SODIUM 50 MG AND SENNOSIDES 8.6 MG SCH TAB: 8.6; 5 TABLET, FILM COATED ORAL at 08:14

## 2019-07-09 RX ADMIN — Medication SCH ML: at 08:16

## 2019-07-09 RX ADMIN — INSULIN LISPRO PRN UNIT: 100 INJECTION, SOLUTION INTRAVENOUS; SUBCUTANEOUS at 17:52

## 2019-07-09 RX ADMIN — ASPIRIN SCH MG: 81 TABLET ORAL at 08:14

## 2019-07-09 RX ADMIN — INSULIN LISPRO PRN UNIT: 100 INJECTION, SOLUTION INTRAVENOUS; SUBCUTANEOUS at 12:07

## 2019-07-09 RX ADMIN — Medication SCH ML: at 21:03

## 2019-07-09 NOTE — PRG
DATE OF SERVICE:  07/09/2019



SUBJECTIVE:  Ms. Bruno was seen by Electrophysiology yesterday. 



Yesterday afternoon, she had an EP study and radiofrequency ablation. 



She was found to have inducible AV rufino reentry tachycardia.  She had slow pathway

modification eliminating __________ inducibility.  She reportedly had a normal AV

node and His-Purkinje function. 



She was transferred back to Critical Care Unit and actually looks well today.



OBJECTIVE:  GENERAL:  She is in no distress. 

VITAL SIGNS:  Blood pressure remains 142/86, heart rate is 87, respiratory rate is

19, oximetry is 90%. 

LUNGS:  Clear. 

HEART:  Regular rhythm. 

ABDOMEN:  Soft.



LABORATORY DATA:  White count 14.8, hemoglobin 13.2, platelets 303 yesterday.  There

is no lab today.  No CBC today. 



Electrolytes are normal today.  BUN 13, creatinine 0.92, potassium 4.1.



IMPRESSION:  Status post radiofrequency ablation procedure for SVT.  Clinically

doing well.  I would think she would be a candidate to go home tomorrow.  She is

certainly candidate to move out of Critical Care Unit.  The other issue, which is

not hospital issue was her bullous pemphigoid.  EP signed off.  We will be signing

off on transfer out of the Critical Care Unit. 







Job ID:  [id number]

## 2019-07-09 NOTE — PRG
DATE OF SERVICE:  07/09/2019



Ms. Bruno had her ablation later yesterday.  She now has a more regular rhythm

with a rate of 80.  She feels fine and is in no distress.  Vital signs are stable

again with a heart rate of around 80.  She can likely be transitioned to a regular

floor today and in-hospital rehabilitation for deconditioning. 







Job ID:  [id number]

## 2019-07-09 NOTE — PDOC.FM
- Subjective


Subjective: 





No events overnight off of the dilt drip s/p ablation. Reports that she fells 

well this AM. Denies any chest pain, palpitations, SOB or N/V on exam. 





- Objective


MAR Reviewed: Yes


Vital Signs & Weight: 


 Vital Signs (12 hours)











  Temp Pulse Ox


 


 07/09/19 04:00  97.9 F 


 


 07/09/19 00:00  98.2 F 


 


 07/08/19 20:00   96








 Weight











Admit Weight                   92.306 kg


 


Weight                         90.8 g











 Most Recent Monitor Data











Heart Rate from ECG            68


 


NIBP                           134/65


 


NIBP BP-Mean                   88


 


Respiration from ECG           15


 


SpO2                           94














I&O: 


 











 07/08/19 07/09/19 07/10/19





 06:59 06:59 06:59


 


Intake Total 1556 620 


 


Output Total 2350 1650 0


 


Balance -794 -1030 0











Result Diagrams: 


 07/08/19 08:24





 07/09/19 07:50





Phys Exam





- Physical Examination


Constitutional: NAD


HEENT: moist MMs


Neck: supple, full ROM


Respiratory: no wheezing, no rales, no rhonchi, clear to auscultation bilateral


Cardiovascular: RRR, no significant murmur


Musculoskeletal: no edema, pulses present


Neurological: non-focal, moves all 4 limbs


Psychiatric: normal affect, A&O x 3


Skin: normal turgor





Dx/Plan


(1) Congestive heart failure with left ventricular diastolic dysfunction


Code(s): I50.30 - UNSPECIFIED DIASTOLIC (CONGESTIVE) HEART FAILURE   Status: 

Chronic   


Qualifiers: 


   Congestive heart failure chronicity: acute   Qualified Code(s): I50.31 - 

Acute diastolic (congestive) heart failure   





(2) Supraventricular tachycardia, paroxysmal


Code(s): I47.1 - SUPRAVENTRICULAR TACHYCARDIA   Status: Acute   





(3) Bullous pemphigoid


Code(s): L12.0 - BULLOUS PEMPHIGOID   Status: Acute   





(4) HLD (hyperlipidemia)


Code(s): E78.5 - HYPERLIPIDEMIA, UNSPECIFIED   Status: Acute   





(5) HTN (hypertension)


Code(s): I10 - ESSENTIAL (PRIMARY) HYPERTENSION   Status: Acute   


Qualifiers: 


   Hypertension type: essential hypertension   Qualified Code(s): I10 - 

Essential (primary) hypertension   





(6) Hypothyroidism


Code(s): E03.9 - HYPOTHYROIDISM, UNSPECIFIED   Status: Acute   





(7) Insulin dependent diabetes mellitus


Code(s): E11.9 - TYPE 2 DIABETES MELLITUS WITHOUT COMPLICATIONS; Z79.4 - LONG 

TERM (CURRENT) USE OF INSULIN   Status: Acute   





(8) Normocytic anemia


Code(s): D64.9 - ANEMIA, UNSPECIFIED   Status: Acute   





- Plan


Plan: 





SVT, previously Afib RVR


- Patient is post-op day #1 s/p ablation. Remained stable off the dilt drip 

overnight. 


- Cardiology and EP on board. Appreciate recs. Will move to telemetry floor & 

gradually taper off of AV rufino blocking agents per EP/Cards recs.


- Will continue Verapamil & digoxin.


- Will continue to monitor HR and BPs closely. 





Congestive heart failure


- Echo: EF 60-65, 1/3 diastolic dysfunction, reduced RV systolic function


- Will continue QD diuresis, strict I&Os and HH, fluid restricted diet. 





NABILA


- Renal function stable w/ eGFR in the 60s for the last several days. Suspect 

possible underlying CKD of at least 2.


- Continue to monitor with daily BMPs


- Avoid nephrotoxic medications





IDDMII


- Will continue lantus at 40U qAM due to persistent hypoglycemia in the AMs.


- CC diet


- ACHS accuchecks, hypoglycemic protocol





Normocytic Anemia


- Hgb stable at 12. Will continue to monitor PRN while on lovenox in the 

hospital. 





Hypothyroidism


- Will continue synthroid; however, labs WNLs on admission after only 2 doses. 

Will need close outpatient follow-up.





Acute hypoxic respiratory failure likely 2/2 pulm congestion related to Afib 

with RVR/CHF, resolved


- Patient has been was off of O2 via nasal canula yesterday & maintained sats 

from % overnight. 


- Will continue to monitor resp status closely. 





Leukocytosis 


- WBC slightly elevated at 14.8 yesterday but no s/s of infection. Will 

continue to monitor.





Bullous Pemphigoid with secondary infection


- Wound care on board.


- Will continue steroid taper


- Will continue Levaquin 500mg daily for a total of 7 days





Hypokalemia, resolved


- K WNLs this AM. Will continue to monitor & replace with oral supplementation 

PRN. 





HTN


- Continue lisinopril at 5mg QD





HLD


- Continue home meds





Indeterminate Troponin


- Trend was negative & repeat yesterday also lower than initial trops





Hyponatremia, resolved 


- Na WNLs this AM. Will continue to monitor








Dispo: Expect ~2-3 additional days as patient will need to be monitored for at 

least 24 hours s/p ablation and will likely require placement upon d/c.

## 2019-07-09 NOTE — PRG
DATE OF SERVICE:  07/09/2019



SUBJECTIVE:  Ms. Bruno is doing well one day after ablation.  No more

palpitations or dizziness are noted. 



OBJECTIVE:  VITAL SIGNS:  Blood pressure is 145/121, recheck 129/55; heart rate 93;

respiration is 14; temperature 97.6 degrees Fahrenheit. 

GENERAL:  Reveals an alert and oriented woman, in no apparent distress. 

NECK:  Supple.  Jugular veins not distended. 

CHEST:  Coarse without crackles. 

HEART:  Sounds are regular to rate and rhythm.  No murmur or gallop. 

ABDOMEN:  Benign.  Bowel sounds positive. 

EXTREMITIES:  Lower extremity without edema, clubbing, or cyanosis.  Pulses are

adequate. 

NEUROLOGIC:  The patient is nonfocal. 

MUSCULOSKELETAL:  Without joint swelling or deformities. 

SKIN:  Without rash.



DATABASE:  The telemetry strips reviewed reveals no recurrence of SVT since ablation

with normal sinus rhythm. 



ASSESSMENT AND PLAN:  

1. Ms. Bruno is a pleasant 86-year-old woman with history of well-documented

recurrent supraventricular tachyarrhythmias.  She underwent an EP study

demonstrating AV rufino reentry tachycardia, which was ablated with no recurrence

since. 

a. The groin sites seem to be healing adequately without major reaction.  At this

point, routine monitoring is advised.  We should be able to cut off her digoxin and

minimize diltiazem use.  Continue verapamil if necessary for blood pressure control. 

2. Bullous pemphigoid as per primary team. 

Routine followup in 6 weeks in the office requested.  EP would sign off.  Call us if

any further issues. 



Thank you for letting me participate in the care of this patient.







Job ID:  [id number]

## 2019-07-10 LAB
ANION GAP SERPL CALC-SCNC: 12 MMOL/L (ref 10–20)
BUN SERPL-MCNC: 23 MG/DL (ref 9.8–20.1)
CALCIUM SERPL-MCNC: 8.7 MG/DL (ref 7.8–10.44)
CHLORIDE SERPL-SCNC: 98 MMOL/L (ref 98–107)
CO2 SERPL-SCNC: 30 MMOL/L (ref 23–31)
CREAT CL PREDICTED SERPL C-G-VRATE: 63 ML/MIN (ref 70–130)
GLUCOSE SERPL-MCNC: 143 MG/DL (ref 83–110)
POTASSIUM SERPL-SCNC: 4.1 MMOL/L (ref 3.5–5.1)
SODIUM SERPL-SCNC: 136 MMOL/L (ref 136–145)

## 2019-07-10 RX ADMIN — INSULIN GLARGINE SCH MLS: 100 INJECTION, SOLUTION SUBCUTANEOUS at 10:07

## 2019-07-10 RX ADMIN — INSULIN LISPRO PRN UNITS: 100 INJECTION, SOLUTION INTRAVENOUS; SUBCUTANEOUS at 22:20

## 2019-07-10 RX ADMIN — Medication SCH ML: at 10:12

## 2019-07-10 RX ADMIN — Medication SCH ML: at 21:12

## 2019-07-10 RX ADMIN — DOCUSATE SODIUM 50 MG AND SENNOSIDES 8.6 MG SCH TAB: 8.6; 5 TABLET, FILM COATED ORAL at 10:05

## 2019-07-10 RX ADMIN — ASPIRIN SCH MG: 81 TABLET ORAL at 10:06

## 2019-07-10 RX ADMIN — DOCUSATE SODIUM 50 MG AND SENNOSIDES 8.6 MG SCH TAB: 8.6; 5 TABLET, FILM COATED ORAL at 21:14

## 2019-07-10 RX ADMIN — INSULIN LISPRO PRN UNIT: 100 INJECTION, SOLUTION INTRAVENOUS; SUBCUTANEOUS at 11:57

## 2019-07-10 NOTE — EKG
Test Reason : 

Blood Pressure : ***/*** mmHG

Vent. Rate : 077 BPM     Atrial Rate : 077 BPM

   P-R Int : 186 ms          QRS Dur : 084 ms

    QT Int : 410 ms       P-R-T Axes : 045 013 -18 degrees

   QTc Int : 463 ms

 

Sinus rhythm with Premature atrial complexes

T wave abnormality, consider anterior ischemia

Abnormal ECG

Confirmed by RAHEL LOTT ([id number]) on 7/10/2019 4:51:09 PM

 

Referred By:             Confirmed By:RAHEL LOTT

## 2019-07-10 NOTE — PDOC.FM
- Subjective


Subjective: 





NAEO. Patient states she feels well this AM. Denies any chest pain, SOB, N/V/D 

but does report some constipation.





- Objective


MAR Reviewed: Yes


Vital Signs & Weight: 


 Vital Signs (12 hours)











  Pulse Ox


 


 07/09/19 18:50  99








 Weight











Admit Weight                   92.306 kg


 


Weight                         90.31 kg











 Most Recent Monitor Data











Heart Rate from ECG            99


 


NIBP                           107/70


 


NIBP BP-Mean                   82


 


Respiration from ECG           12


 


SpO2                           96














I&O: 


 











 07/08/19 07/09/19 07/10/19





 06:59 06:59 06:59


 


Intake Total 9017 905 8705


 


Output Total 2350 1650 2400


 


Balance -794 -1030 -820











Result Diagrams: 


 07/08/19 08:24





 07/10/19 07:07





Phys Exam





- Physical Examination


Constitutional: NAD


HEENT: moist MMs


Neck: supple, full ROM


Respiratory: no wheezing, no rales, no rhonchi, clear to auscultation bilateral


Cardiovascular: RRR, no significant murmur


Musculoskeletal: no edema, pulses present


Neurological: non-focal, moves all 4 limbs


Psychiatric: normal affect, A&O x 3


Skin: normal turgor





Dx/Plan


(1) Congestive heart failure with left ventricular diastolic dysfunction


Code(s): I50.30 - UNSPECIFIED DIASTOLIC (CONGESTIVE) HEART FAILURE   Status: 

Chronic   


Qualifiers: 


   Congestive heart failure chronicity: acute   Qualified Code(s): I50.31 - 

Acute diastolic (congestive) heart failure   





(2) Supraventricular tachycardia, paroxysmal


Code(s): I47.1 - SUPRAVENTRICULAR TACHYCARDIA   Status: Resolved   





(3) Bullous pemphigoid


Code(s): L12.0 - BULLOUS PEMPHIGOID   Status: Acute   





(4) HLD (hyperlipidemia)


Code(s): E78.5 - HYPERLIPIDEMIA, UNSPECIFIED   Status: Acute   





(5) HTN (hypertension)


Code(s): I10 - ESSENTIAL (PRIMARY) HYPERTENSION   Status: Acute   


Qualifiers: 


   Hypertension type: essential hypertension   Qualified Code(s): I10 - 

Essential (primary) hypertension   





(6) Hypothyroidism


Code(s): E03.9 - HYPOTHYROIDISM, UNSPECIFIED   Status: Acute   





(7) Insulin dependent diabetes mellitus


Code(s): E11.9 - TYPE 2 DIABETES MELLITUS WITHOUT COMPLICATIONS; Z79.4 - LONG 

TERM (CURRENT) USE OF INSULIN   Status: Acute   





(8) Normocytic anemia


Code(s): D64.9 - ANEMIA, UNSPECIFIED   Status: Acute   





- Plan


Plan: 





SVT, previously Afib RVR


- Patient is post-op day #2 s/p ablation. Remained stable overnight off dilt 

drip and digoxin but did note some transient tachycardia into the 110s on exam 

this AM but patient was asymptomatic.


- Cardiology and EP have signed off. Will move to telemetry floor & continue to 

monitor closely pending placement to a SNF for continued PT/OT.


- Will continue original home dose of Verapamil & consider adding an additional 

AM dose due to recurrent short runs of tachycardia noted yesterday and this AM.





Congestive heart failure


- Echo: EF 60-65, 1/3 diastolic dysfunction, reduced RV systolic function


- Will continue strict I&Os and HH, fluid restricted diet. Will stop TACO lasix 

as patient is satting well on RA and peripheral edema has resolved.





NABILA


- Renal function stable w/ eGFR in the 60s for the last several days. Suspect 

possible underlying CKD of at least 2.


- Continue to monitor with daily BMPs


- Avoid nephrotoxic medications





IDDMII


- Will continue lantus but will increase back up to 45U this AM due to 

significantly elevated BG levels over the course of the day yesterday.


- CC diet


- ACHS accuchecks, hypoglycemic protocol





Normocytic Anemia


- Hgb stable at 12. Will continue to monitor PRN while on lovenox in the 

hospital. 





Hypothyroidism


- Will continue synthroid; however, labs WNLs on admission after only 2 doses. 

Will need close outpatient follow-up.





Acute hypoxic respiratory failure likely 2/2 pulm congestion related to Afib 

with RVR/CHF, resolved


- Patient has been was off of O2 via nasal canula yesterday & maintained sats 

from % overnight. 


- Will continue to monitor resp status closely. 





Leukocytosis 


- WBC slightly elevated at 14.8 yesterday but no s/s of infection. Will 

continue to monitor.





Bullous Pemphigoid with secondary infection


- Wound care on board.


- Will continue steroid taper


- s/p a 7 day course of IV levaquin for superimposed infection





Hypokalemia, resolved


- K WNLs this AM. Will continue to monitor & replace with oral supplementation 

PRN. 





HTN


- Continue lisinopril at 5mg QD





HLD


- Continue home meds





Indeterminate Troponin


- Trend was negative & repeat yesterday also lower than initial trops





Hyponatremia, resolved 


- Na WNLs this AM. Will continue to monitor








Dispo: Anticipate d/c within the next 24 hours to HH with PT or SNF. 











Addendum - Attending





- Attending Attestation


Date/Time: 07/10/19 8515





I personally evaluated the patient and discussed the management with Dr. Quinones.


I agree with the History, Examination, Assessment and Plan documented above 

with any addition or exceptions noted below.





Patient here for several days for a host of reasons, most recently and acutely 

her AVNRT that is now s/p ablation with EP. She has had no recurrence of this 

issue. On Verapamil and EP has signed off. Had some episodes of tachycardia 

from PVCs but asymptomatic. Will clarify with Cardiology if any further mgmt 

warranted. She is deconditioned and we are working on placement to help her get 

strength back. Patient has constipation refractory  to Miralax and stimulant 

stool softener. Will administer lactulose and enema today. Anticipate she is 

nearing discharge once placement decision has been made.

## 2019-07-11 LAB
ANION GAP SERPL CALC-SCNC: 10 MMOL/L (ref 10–20)
BUN SERPL-MCNC: 15 MG/DL (ref 9.8–20.1)
CALCIUM SERPL-MCNC: 9 MG/DL (ref 7.8–10.44)
CHLORIDE SERPL-SCNC: 103 MMOL/L (ref 98–107)
CO2 SERPL-SCNC: 29 MMOL/L (ref 23–31)
CREAT CL PREDICTED SERPL C-G-VRATE: 78 ML/MIN (ref 70–130)
GLUCOSE SERPL-MCNC: 60 MG/DL (ref 83–110)
MAGNESIUM SERPL-MCNC: 2 MG/DL (ref 1.6–2.6)
POTASSIUM SERPL-SCNC: 3.9 MMOL/L (ref 3.5–5.1)
SODIUM SERPL-SCNC: 138 MMOL/L (ref 136–145)

## 2019-07-11 RX ADMIN — Medication SCH ML: at 08:54

## 2019-07-11 RX ADMIN — INSULIN GLARGINE SCH MLS: 100 INJECTION, SOLUTION SUBCUTANEOUS at 08:53

## 2019-07-11 RX ADMIN — DOCUSATE SODIUM 50 MG AND SENNOSIDES 8.6 MG SCH TAB: 8.6; 5 TABLET, FILM COATED ORAL at 08:52

## 2019-07-11 RX ADMIN — DOCUSATE SODIUM 50 MG AND SENNOSIDES 8.6 MG SCH: 8.6; 5 TABLET, FILM COATED ORAL at 20:28

## 2019-07-11 RX ADMIN — Medication SCH ML: at 20:28

## 2019-07-11 RX ADMIN — INSULIN LISPRO PRN UNIT: 100 INJECTION, SOLUTION INTRAVENOUS; SUBCUTANEOUS at 17:42

## 2019-07-11 RX ADMIN — ASPIRIN SCH MG: 81 TABLET ORAL at 08:52

## 2019-07-11 NOTE — PDOC.FM
- Subjective


Subjective: 





Patient had multiple loose BMs overnight. Reports feeling much better this AM. 

Denies any N/V, fever/chills, chest pain, SOB or palpitations. 





- Objective


MAR Reviewed: Yes


Vital Signs & Weight: 


 Vital Signs (12 hours)











  Temp Pulse Resp BP Pulse Ox


 


 07/11/19 04:09  99.2 F  73  20  144/61 H  92 L


 


 07/11/19 00:00   89  14  146/74 H  99


 


 07/10/19 20:00  97.4 F L  102 H  17  144/71 H  97








 Weight











Admit Weight                   92.306 kg


 


Weight                         89.086 kg











 Most Recent Monitor Data











Heart Rate from ECG            106


 


NIBP                           141/66


 


NIBP BP-Mean                   91


 


Respiration from ECG           19


 


SpO2                           82














I&O: 


 











 07/10/19 07/11/19 07/12/19





 06:59 06:59 06:59


 


Intake Total 1580 600 


 


Output Total 2400 100 


 


Balance -820 500 











Result Diagrams: 


 07/08/19 08:24





 07/11/19 07:38





Phys Exam





- Physical Examination


Constitutional: NAD


HEENT: moist MMs


Neck: supple, full ROM


Respiratory: no wheezing, no rales, no rhonchi, clear to auscultation bilateral


Cardiovascular: RRR, no significant murmur


Gastrointestinal: soft, non-tender, no distention, positive bowel sounds


Musculoskeletal: pulses present, edema present (trace, non-pitting edema noted 

in B/L ankles)


Neurological: non-focal, moves all 4 limbs


Psychiatric: normal affect, A&O x 3


Skin: normal turgor





Dx/Plan


(1) Congestive heart failure with left ventricular diastolic dysfunction


Code(s): I50.30 - UNSPECIFIED DIASTOLIC (CONGESTIVE) HEART FAILURE   Status: 

Chronic   


Qualifiers: 


   Congestive heart failure chronicity: acute   Qualified Code(s): I50.31 - 

Acute diastolic (congestive) heart failure   





(2) Supraventricular tachycardia, paroxysmal


Code(s): I47.1 - SUPRAVENTRICULAR TACHYCARDIA   Status: Resolved   





(3) Bullous pemphigoid


Code(s): L12.0 - BULLOUS PEMPHIGOID   Status: Chronic   





(4) HLD (hyperlipidemia)


Code(s): E78.5 - HYPERLIPIDEMIA, UNSPECIFIED   Status: Chronic   





(5) HTN (hypertension)


Code(s): I10 - ESSENTIAL (PRIMARY) HYPERTENSION   Status: Chronic   


Qualifiers: 


   Hypertension type: essential hypertension   Qualified Code(s): I10 - 

Essential (primary) hypertension   





(6) Hypothyroidism


Code(s): E03.9 - HYPOTHYROIDISM, UNSPECIFIED   Status: Chronic   





(7) Insulin dependent diabetes mellitus


Code(s): E11.9 - TYPE 2 DIABETES MELLITUS WITHOUT COMPLICATIONS; Z79.4 - LONG 

TERM (CURRENT) USE OF INSULIN   Status: Chronic   





(8) Normocytic anemia


Code(s): D64.9 - ANEMIA, UNSPECIFIED   Status: Chronic   





(9) Constipation


Code(s): K59.00 - CONSTIPATION, UNSPECIFIED   Status: Acute   





- Plan


Plan: 





Constipation


- s/p disimpaction and lactulose and enema therapy yesterday evening and had 3 

loose BMs. 


- Will continue QD miralax and senna but will increase to 51g of miralax QD, 

especially in the setting of opioid use.


- Will continue strict I&O to ensure regular BMs occur.





SVT, previously Afib RVR, resolved


- Patient is post-op day #3 s/p ablation. Does have some transient tachycardic 

episodes into the 110s on exam this AM but has remained asymptomatic. Per EP no 

need to change current med regimen based on this. 


- Will continue original home dose of Verapamil, 180mg HS.





Congestive heart failure


- Echo: EF 60-65, 1/3 diastolic dysfunction, reduced RV systolic function


- Will continue strict I&Os and HH, fluid restricted diet. Will stop TACO lasix 

as patient is satting well on RA and peripheral edema has resolved.





NABILA, resolved


- Renal function stable w/ eGFR in the upper 50s-60s for the last several days. 

Suspect possible underlying CKD III.


- Continue to monitor with daily BMPs


- Avoid nephrotoxic medications





IDDMII


- Will continue lantus at 45U this AM due to significantly elevated BG levels 

over the course of the day yesterday. Also added metformin as A1c was ~9 on 

admission. Will consider adding mealtime insulin as well as these seem to be 

her highest BG levels throughout the day.


- 1800 calorie CC diet


- ACHS accuchecks, hypoglycemic protocol





Normocytic Anemia


- Hgb stable at 12. Will continue to monitor PRN while on lovenox in the 

hospital. 





Hypothyroidism


- Will continue synthroid; however, labs WNLs on admission after only 2 doses. 

Will need close outpatient follow-up.





Bullous Pemphigoid with secondary infection


- Wound care on board.


- Will continue steroid taper but attempt to get derm recs regarding when we 

can wean down to 10mg and for how long.


- s/p a 7 day course of IV levaquin for superimposed infection





HTN


- Continue lisinopril at 5mg QD





HLD


- Continue home meds





Indeterminate Troponin


- Trend was negative & repeat yesterday also lower than initial trops





Hyponatremia, resolved 


- Na WNLs this AM. Will continue to monitor





Hypokalemia, resolved


- K WNLs this AM. Will continue to monitor & replace with oral supplementation 

PRN. 





Acute hypoxic respiratory failure likely 2/2 pulm congestion related to Afib 

with RVR/CHF, resolved


- Patient has been was off of O2 via nasal canula yesterday & maintained sats 

from % overnight. 


- Will continue to monitor resp status closely. 





Leukocytosis 


- WBC slightly elevated at 14.8 yesterday but no s/s of infection. Will 

continue to monitor.








Dispo: Anticipate d/c within the next 24 hours to SNF in [place] pending 

insurance approval. 














Addendum - Attending





- Attending Attestation


Date/Time: 07/11/19 1018





I personally evaluated the patient and discussed the management with Dr. Quinones.


I agree with the History, Examination, Assessment and Plan documented above 

with any addition or exceptions noted below.





Patient doing well this morning, resting comfortably. She has been able to have 

multiple bowel movements since disimpaction yesterday. Her HR is stable. She is 

stable for transfer to [facility] whenever insurance approval occurs. 

We will taper down her prednisone that she is taking for bullous pemphigoid.

## 2019-07-12 ENCOUNTER — HOSPITAL ENCOUNTER (INPATIENT)
Dept: HOSPITAL 57 - BURMED | Age: 84
LOS: 11 days | Discharge: HOME HEALTH | DRG: 308 | End: 2019-07-23
Attending: FAMILY MEDICINE | Admitting: FAMILY MEDICINE
Payer: MEDICARE

## 2019-07-12 VITALS — DIASTOLIC BLOOD PRESSURE: 56 MMHG | SYSTOLIC BLOOD PRESSURE: 123 MMHG | TEMPERATURE: 98 F

## 2019-07-12 VITALS — BODY MASS INDEX: 32.5 KG/M2

## 2019-07-12 DIAGNOSIS — E11.9: ICD-10-CM

## 2019-07-12 DIAGNOSIS — E03.9: ICD-10-CM

## 2019-07-12 DIAGNOSIS — L12.0: ICD-10-CM

## 2019-07-12 DIAGNOSIS — R53.81: ICD-10-CM

## 2019-07-12 DIAGNOSIS — E78.5: ICD-10-CM

## 2019-07-12 DIAGNOSIS — I50.30: ICD-10-CM

## 2019-07-12 DIAGNOSIS — I48.91: Primary | ICD-10-CM
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
## 2019-07-12 DIAGNOSIS — Z79.82: ICD-10-CM

## 2019-07-12 DIAGNOSIS — J95.821: ICD-10-CM

## 2019-07-12 LAB
ANION GAP SERPL CALC-SCNC: 12 MMOL/L (ref 10–20)
BASOPHILS # BLD AUTO: 0.1 THOU/UL (ref 0–0.2)
BASOPHILS NFR BLD AUTO: 0.6 % (ref 0–1)
BUN SERPL-MCNC: 16 MG/DL (ref 9.8–20.1)
CALCIUM SERPL-MCNC: 9.5 MG/DL (ref 7.8–10.44)
CHLORIDE SERPL-SCNC: 100 MMOL/L (ref 98–107)
CO2 SERPL-SCNC: 29 MMOL/L (ref 23–31)
CREAT CL PREDICTED SERPL C-G-VRATE: 76 ML/MIN (ref 70–130)
EOSINOPHIL # BLD AUTO: 0.2 THOU/UL (ref 0–0.7)
EOSINOPHIL NFR BLD AUTO: 2.3 % (ref 0–10)
GLUCOSE SERPL-MCNC: 66 MG/DL (ref 83–110)
HGB BLD-MCNC: 13.1 G/DL (ref 12–16)
LYMPHOCYTES # BLD: 3 THOU/UL (ref 1.2–3.4)
LYMPHOCYTES NFR BLD AUTO: 28.1 % (ref 21–51)
MCH RBC QN AUTO: 31.8 PG (ref 27–31)
MCV RBC AUTO: 97.9 FL (ref 78–98)
MONOCYTES # BLD AUTO: 0.6 THOU/UL (ref 0.11–0.59)
MONOCYTES NFR BLD AUTO: 5.9 % (ref 0–10)
NEUTROPHILS # BLD AUTO: 6.7 THOU/UL (ref 1.4–6.5)
NEUTROPHILS NFR BLD AUTO: 63.2 % (ref 42–75)
PLATELET # BLD AUTO: 282 THOU/UL (ref 130–400)
POTASSIUM SERPL-SCNC: 4.2 MMOL/L (ref 3.5–5.1)
RBC # BLD AUTO: 4.13 MILL/UL (ref 4.2–5.4)
SODIUM SERPL-SCNC: 137 MMOL/L (ref 136–145)
WBC # BLD AUTO: 10.6 THOU/UL (ref 4.8–10.8)

## 2019-07-12 PROCEDURE — 97602 WOUND(S) CARE NON-SELECTIVE: CPT

## 2019-07-12 PROCEDURE — 36416 COLLJ CAPILLARY BLOOD SPEC: CPT

## 2019-07-12 RX ADMIN — INSULIN GLARGINE SCH MLS: 100 INJECTION, SOLUTION SUBCUTANEOUS at 09:28

## 2019-07-12 RX ADMIN — DOCUSATE SODIUM 50 MG AND SENNOSIDES 8.6 MG SCH TAB: 8.6; 5 TABLET, FILM COATED ORAL at 22:42

## 2019-07-12 RX ADMIN — Medication SCH TAB: at 22:42

## 2019-07-12 RX ADMIN — ASPIRIN SCH MG: 81 TABLET ORAL at 08:43

## 2019-07-12 RX ADMIN — DOCUSATE SODIUM 50 MG AND SENNOSIDES 8.6 MG SCH TAB: 8.6; 5 TABLET, FILM COATED ORAL at 08:43

## 2019-07-12 RX ADMIN — Medication SCH ML: at 08:44

## 2019-07-12 RX ADMIN — BETAMETHASONE VALERATE SCH APPLIC: 1 CREAM TOPICAL at 22:43

## 2019-07-12 RX ADMIN — INSULIN LISPRO SCH: 100 INJECTION, SOLUTION INTRAVENOUS; SUBCUTANEOUS at 07:49

## 2019-07-12 RX ADMIN — INSULIN LISPRO SCH UNIT: 100 INJECTION, SOLUTION INTRAVENOUS; SUBCUTANEOUS at 12:14

## 2019-07-12 NOTE — PDOC.FM
- Subjective


Subjective: 





NAEO. Patient reports that she feels well this AM. Denies any N/V or 

constipation. Denies any fever/chills, SOB or palpitations. Is ready to leave 

the hospital. 





- Objective


MAR Reviewed: Yes


Vital Signs & Weight: 


 Vital Signs (12 hours)











  Temp Pulse Resp BP BP Pulse Ox


 


 07/12/19 04:00  97.8 F  72  15  156/73 H   94 L


 


 07/11/19 23:56   83   154/69 H   98


 


 07/11/19 20:45       96


 


 07/11/19 20:25  97.6 F  81  18   115/53 L  96








 Weight











Admit Weight                   92.306 kg


 


Weight                         88.904 kg











 Most Recent Monitor Data











Heart Rate from ECG            106


 


NIBP                           141/66


 


NIBP BP-Mean                   91


 


Respiration from ECG           19


 


SpO2                           82














I&O: 


 











 07/11/19 07/12/19 07/13/19





 06:59 06:59 06:59


 


Intake Total 600 960 


 


Output Total 100 300 


 


Balance 500 660 











Result Diagrams: 


 07/12/19 08:02





 07/12/19 04:16





Phys Exam





- Physical Examination


Constitutional: NAD


HEENT: moist MMs


Neck: supple, full ROM


Respiratory: no wheezing, no rales, no rhonchi, clear to auscultation bilateral


Cardiovascular: RRR, no significant murmur


Gastrointestinal: soft, non-tender, no distention, positive bowel sounds


Musculoskeletal: pulses present, edema present


Neurological: non-focal, moves all 4 limbs


Psychiatric: normal affect, A&O x 3


Skin: normal turgor





Dx/Plan


(1) Congestive heart failure with left ventricular diastolic dysfunction


Code(s): I50.30 - UNSPECIFIED DIASTOLIC (CONGESTIVE) HEART FAILURE   Status: 

Chronic   


Qualifiers: 


   Congestive heart failure chronicity: acute   Qualified Code(s): I50.31 - 

Acute diastolic (congestive) heart failure   





(2) Supraventricular tachycardia, paroxysmal


Code(s): I47.1 - SUPRAVENTRICULAR TACHYCARDIA   Status: Resolved   





(3) Bullous pemphigoid


Code(s): L12.0 - BULLOUS PEMPHIGOID   Status: Chronic   





(4) HLD (hyperlipidemia)


Code(s): E78.5 - HYPERLIPIDEMIA, UNSPECIFIED   Status: Chronic   





(5) HTN (hypertension)


Code(s): I10 - ESSENTIAL (PRIMARY) HYPERTENSION   Status: Chronic   


Qualifiers: 


   Hypertension type: essential hypertension   Qualified Code(s): I10 - 

Essential (primary) hypertension   





(6) Hypothyroidism


Code(s): E03.9 - HYPOTHYROIDISM, UNSPECIFIED   Status: Chronic   





(7) Insulin dependent diabetes mellitus


Code(s): E11.9 - TYPE 2 DIABETES MELLITUS WITHOUT COMPLICATIONS; Z79.4 - LONG 

TERM (CURRENT) USE OF INSULIN   Status: Chronic   





(8) Normocytic anemia


Code(s): D64.9 - ANEMIA, UNSPECIFIED   Status: Chronic   





(9) Constipation


Code(s): K59.00 - CONSTIPATION, UNSPECIFIED   Status: Acute   





- Plan


Plan: 





Constipation, resolved


- s/p disimpaction and lactulose and enema therapy. has had QD BMs since. 


- Will continue TACO miralax and senna, especially in the setting of opioid use.


- Will continue strict I&O to ensure regular BMs occur regularly.





SVT, previously Afib RVR, resolved


- Patient is post-op day #4 s/p ablation. No events overnight.


- Will continue Verapamil 180mg HS.





Congestive heart failure


- Echo: EF 60-65, 1/3 diastolic dysfunction, reduced RV systolic function


- Will continue strict I&Os and HH, fluid restricted diet. 


- Will add PRN lasix if patient's weight increases by 2 or more pounds/day to 

prevent volume overload.





NABILA, improving


- eGFR in the 70s this AM since stopping TACO lasix.


- Will continue to monitor with daily BMPs


- Avoid nephrotoxic medications





IDDMII


- Will continue lantus at 45U this AM due to significantly elevated BG levels 

over the course of the day yesterday but will add a bedtime snack to help with 

AM hypoglycemia. Will consider increase mealtime insulin SSI doses as these 

seem to be her highest BG levels throughout the day. Will continue metformin.


- 1800 calorie CC diet


- ACHS accuchecks, hypoglycemia protocol





Normocytic Anemia


- Hgb stable at 12 on 7/8. Will continue to monitor PRN while on lovenox in the 

hospital. 





Hypothyroidism


- Will continue synthroid; however, labs WNLs on admission after only 2 doses. 

Will need close outpatient follow-up.





Bullous Pemphigoid with secondary infection


- Wound care on board.


- Will continue steroid taper but attempt to get derm recs regarding when we 

can wean down to 10mg and for how long.


- s/p a 7 day course of IV levaquin for superimposed infection





HTN


- Continue lisinopril at 5mg QD





HLD


- Continue home meds





Indeterminate Troponin


- Trend was negative & repeat yesterday also lower than initial trops





Hyponatremia, resolved 


- Na WNLs this AM. Will continue to monitor





Hypokalemia, resolved


- K WNLs this AM. Will continue to monitor & replace with oral supplementation 

PRN. 





Acute hypoxic respiratory failure likely 2/2 pulm congestion related to Afib 

with RVR/CHF, resolved


- Patient has been was off of O2 via nasal canula yesterday & maintained sats 

from % overnight. 


- Will continue to monitor resp status closely. 





Leukocytosis 


- WBC slightly elevated at 14.8 on 7/8 but no s/s of infection then or since. 

Will continue to monitor.








Dispo: Anticipate d/c within the next 24 hours to SNF in [place] pending 

insurance approval. 














Addendum - Attending





- Attending Attestation


Date/Time: 07/12/19 7467





I personally evaluated the patient and discussed the management with Dr. Quinones.


I agree with the History, Examination, Assessment and Plan documented above 

with any addition or exceptions noted below.





Patient doing well today. No complaints. Stable for discharge and awaiting 

insurance approval for swing bed placement.

## 2019-07-12 NOTE — DIS
DATE OF ADMISSION:  06/30/2019



DATE OF DISCHARGE:  07/12/2019



RESIDENT:  Dr. Dara Quinones.



CONSULTS:  

1. Pulmonology, Dr. Karel Damon.

2. Cardiology, Dr. Kvng Hyatt.

3. EP, Dr. Sander Oswald.



PROCEDURES:  

1. Chest x-ray on 06/30/2019, which showed borderline to minimal cardiomegaly.

2. Brain CT on 06/30/2019, which showed no acute intracranial process.

3. Echocardiogram on 07/02/2019, which showed an EF estimated at 60% to 65% with

grade 1/3 diastolic dysfunction and dilated right ventricle with reduced RV 
systolic

function and an elevated right ventricular systolic pressure estimated at 50 
mmHg. 

4. Radiofrequency ablation on 07/08/2019.



PRIMARY DIAGNOSES:  

1. Atrial fibrillation with rapid ventricular response.

2. Refractory atrioventricular rufino reentry tachycardia.

3. Bacterial superinfection of chronic bullous pemphigoid wound.

4. Acute respiratory failure with hypoxia secondary to cardiac arrhythmia and/or

congestive heart failure exacerbation. 

5. New onset heart failure with preserved ejection fraction.

6. Acute kidney injury.



SECONDARY DIAGNOSES:  

1. Bullous pemphigoid.

2. Insulin-dependent diabetes mellitus type 2.

3. Hypothyroidism.

4. Constipation.

5. Hypertension.

6. Hyperlipidemia.



DISCHARGE MEDICATIONS:  

1. Verapamil 180 mg p.o. at bedtime.

2. Triamcinolone acetonide 1g topically b.i.d.

3. Niacin 100 mg p.o. b.i.d.

4. Aspirin 81 mg p.o. q.a.m.

5. Norco 5/325 one tablet p.o. q.6 hours p.r.n.

6. Drisdol 1 capsule p.o. every 7 days.

7. Calcium carbonate/vitamin D3 one tablet p.o. b.i.d.

8. Lantus 45 units subcu daily.

9. Linzess 145 mcg p.o. daily.

10. Levothyroxine 50mcg p.o. daily.

11. Metformin 500 mg p.o. b.i.d. with meals.

12. MiraLAX 51 g p.o. daily.

13. Acetaminophen 1000 mg p.o. q.6 hours p.r.n.

14. Humalog sliding scale 4 to 16 units subcu t.i.d. with meals.

15. Prednisone 10 mg p.o. q.a.m. with meals for 9 days.

16. Senokot-S 2 tablets p.o. b.i.d.

17. Lisinopril 5 mg p.o. daily. 

Discontinued medications;

1. Prednisone 20 mg p.o. q.a.m. with meals.

2. MiraLAX 34 g p.o. daily.



HOSPITAL COURSE:  The patient is an 86-year-old  female with a PMH

significant for hypertension, hyperlipidemia, insulin-dependent diabetes,

and bullous pemphigoid, who presented to the emergency department with a

chief complaint of dizziness and weakness that began approximately 3 to 4 days 
prior

to the date of presentation.  The patient reported that she typically walks 
with a

walker, but over the last several days, it had become more difficult for her

to ambulate and do regular tasks at home as she would get very easily short of

breath and weak.  Of note, the patient had been

recently hospitalized at [facility] for bullous pemphigoid and was discharged on

June 17th and reports her daughter had been home helping take care of her since.

On presentation to the ED she complained of weakness with associated orthopnea, 

PND, and bilateral lower extremity edema & her heart rate was noted to

be in the 160 to 180s and was irregularly irregular.  She was determined to be 
in

atrial fibrillation with RVR and was started on a diltiazem drip.  In addition, 
she

was placed on 2 L of oxygen to maintain adequate O2 saturations. A chest x-ray

and brain CT were obtained, both of which were insignificant.  Routine lab work

including a CBC, CMP, CK-MB, troponin, and TSH, were obtained as well and were

notable for a WBC count of 11.5, BUN and creatinine of 21 and 1.13, an initial

troponin-I of 0.117 and magnesium level of 1.4.  Lastly, her BNP was elevated at

629.7, so an echocardiogram was ordered for the following day. Her TSH was

within normal limits and her troponin was noted to downtrend over the course of 
her

hospitalization.  However, due to her requiring diltiazem drip to regulate her 
heart

rate, she was transferred to the telemetry floor for close monitoring overnight 
with plans

to transition to p.o. medications for rate control as tolerated. 



Regarding her arrhythmia, over the course of her hospitalization, she was 
eventually

weaned off the diltiazem drip and transitioned to her home dose of p.o.

verapamil with an additional a.m. dose, which maintained a normal heart rate 
until

approximately day 5 of hospitalization, during which she had an episode of

refractory supraventricular tachycardia.  The SVT was nonresponsive to a total 
of 30 mg

of IV adenosine and she was therefore placed back on a Cardizem drip & 
transferred to the CCU

as she was noted to be hypotensive during this episode.  Cardiology, Dr. Hyatt,

and Electrophysiology, Dr. Sander Oswald, came and evaluated the patient and

recommended an ablation the following Monday on 07/08/2019.  The patient 
remained

stable on the diltiazem drip over the weekend and underwent her ablation as 
planned

without any complications.  Following her procedure, she was taken off all rate-
controlling 

agents with the exception of her original home dosing of verapamil as she was 
noted to need 

this for adequate blood pressure control.  The day following her ablation, EP 
and Cardiology 

both signed off and the patient was instructed to follow up with the Dr. Oswald 
within 6 weeks of

discharge for outpatient monitoring. 



Regarding the patient's acute hypoxic respiratory failure 2/2 new onset CHF, 
this 

diagnosis was confirmed via an

echocardiogram that was obtained on her third day of hospitalization, which 
noted

grade 1/3 diastolic dysfunction and stiff right ventricle, but a preserved 
ejection

fraction of 60% to 65%.  Over the course of her hospital stay, the patient was

initially given doses of IV Lasix to improve her lower extremity edema and

respiratory status which was eventually transitioned to p.o. Lasix and was

discontinued by the date of discharge.  At that time, the patient was 
maintaining

adequate saturations on room air and her lower extremity edema had completely

resolved.  She was instructed to follow up with her PCP, Dr. Lan Torres,

within 1 week of discharge for close monitoring of this. 



Regarding the patient's superinfection of her bullous pemphigoid, cultures of 
her

wounds were obtained over the course of her hospitalization and were positive 
for

both Serratia marcescens and Streptococcus agalactiae. She was followed closely

by wound care for this and completed a full 7-day 

course of IV Levaquin as well.  Lastly, the patient was also continued on her 
prednisone 

steroid taper prescribed by her outpatient dermatologist and by the date of 
discharge, was

down to 10 mg p.o. per day, which she would continue for 9 days upon discharge. 



Regarding the patient's diabetes, her blood glucose levels varied greatly over 
the

course of her hospitalization and many changes were made to her home diabetes

regimen including adding scheduled premeal Humalog ranging from 4 to 16 units 
based

on a sliding scale regimen and the addition of metformin 500 mg p.o. b.i.d. with

meals as her A1c was noted to be elevated at 9.2%.  These were continued upon 
the

patient's discharge to the skilled nursing facility in [place]. 



Regarding the patient's acute kidney injury, this was suspected to be prerenal 
in

nature as the patient had had decreased p.o. intake in the days prior to her

hospitalization.  Her renal function

was noted to improve significantly over the course of her hospital stay and

 her BUN and creatinine were down to 16 and 0.75, and EGFR 73 by the date of 
discharge. 



Lastly, regarding the patient's now second recent hospitalization, she was 
noted to

have significant physical deconditioning.  Physical Therapy and Occupational

Therapy worked with her for the duration of her hospital stay and it was 
recommended

that she have a short stay in a skilled nursing facility for continued PT and 
OT to

ensure adequate safety upon her return home.  She was therefore cleared for

discharge to Skilled Nursing in [place], [place], under the supervision of Dr. Vanessa Tucker after insurance approval on 07/12/2019. 



DISPOSITION:  Stable.



DISCHARGE INSTRUCTIONS:  

1. Location:  [place], [place], Skilled Nursing Facility at [facility].

2. Diet:  Consistent carb, heart healthy, low-sodium diet.

3. Activity:  As tolerated.  No restrictions.

4. Followup:  The patient was instructed to follow up with her PCP, Dr. Torres

within 1 week of discharge as well as Dr. Sander Oswald within 6 weeks of 
discharge. 







Job ID:  [id number]



[facility]

## 2019-07-13 RX ADMIN — BETAMETHASONE VALERATE SCH APPLIC: 1 CREAM TOPICAL at 09:50

## 2019-07-13 RX ADMIN — INSULIN LISPRO PRN UNIT: 100 INJECTION, SOLUTION INTRAVENOUS; SUBCUTANEOUS at 17:36

## 2019-07-13 RX ADMIN — ASPIRIN SCH MG: 81 TABLET ORAL at 09:04

## 2019-07-13 RX ADMIN — DOCUSATE SODIUM 50 MG AND SENNOSIDES 8.6 MG SCH: 8.6; 5 TABLET, FILM COATED ORAL at 22:20

## 2019-07-13 RX ADMIN — INSULIN GLARGINE SCH UNIT: 100 INJECTION, SOLUTION SUBCUTANEOUS at 09:50

## 2019-07-13 RX ADMIN — INSULIN LISPRO PRN UNIT: 100 INJECTION, SOLUTION INTRAVENOUS; SUBCUTANEOUS at 22:44

## 2019-07-13 RX ADMIN — BETAMETHASONE VALERATE SCH APPLIC: 1 CREAM TOPICAL at 22:21

## 2019-07-13 RX ADMIN — Medication SCH TAB: at 22:21

## 2019-07-13 RX ADMIN — Medication SCH TAB: at 09:05

## 2019-07-13 RX ADMIN — DOCUSATE SODIUM 50 MG AND SENNOSIDES 8.6 MG SCH TAB: 8.6; 5 TABLET, FILM COATED ORAL at 09:04

## 2019-07-14 RX ADMIN — INSULIN GLARGINE SCH UNIT: 100 INJECTION, SOLUTION SUBCUTANEOUS at 09:37

## 2019-07-14 RX ADMIN — Medication SCH TAB: at 21:30

## 2019-07-14 RX ADMIN — INSULIN LISPRO PRN UNIT: 100 INJECTION, SOLUTION INTRAVENOUS; SUBCUTANEOUS at 21:51

## 2019-07-14 RX ADMIN — BETAMETHASONE VALERATE SCH APPLIC: 1 CREAM TOPICAL at 21:31

## 2019-07-14 RX ADMIN — INSULIN LISPRO PRN UNIT: 100 INJECTION, SOLUTION INTRAVENOUS; SUBCUTANEOUS at 13:11

## 2019-07-14 RX ADMIN — BETAMETHASONE VALERATE SCH APPLIC: 1 CREAM TOPICAL at 09:50

## 2019-07-14 RX ADMIN — Medication SCH TAB: at 09:38

## 2019-07-14 RX ADMIN — DOCUSATE SODIUM 50 MG AND SENNOSIDES 8.6 MG SCH TAB: 8.6; 5 TABLET, FILM COATED ORAL at 09:39

## 2019-07-14 RX ADMIN — CARVEDILOL SCH EACH: 3.12 TABLET, FILM COATED ORAL at 21:30

## 2019-07-14 RX ADMIN — ERGOCALCIFEROL SCH MG: 1.25 CAPSULE, LIQUID FILLED ORAL at 10:15

## 2019-07-14 RX ADMIN — DOCUSATE SODIUM 50 MG AND SENNOSIDES 8.6 MG SCH TAB: 8.6; 5 TABLET, FILM COATED ORAL at 21:30

## 2019-07-14 RX ADMIN — CARVEDILOL SCH EACH: 3.12 TABLET, FILM COATED ORAL at 11:38

## 2019-07-14 RX ADMIN — CARVEDILOL SCH: 3.12 TABLET, FILM COATED ORAL at 11:34

## 2019-07-14 RX ADMIN — INSULIN LISPRO PRN UNIT: 100 INJECTION, SOLUTION INTRAVENOUS; SUBCUTANEOUS at 17:33

## 2019-07-14 RX ADMIN — ASPIRIN SCH MG: 81 TABLET ORAL at 09:38

## 2019-07-14 RX ADMIN — CARVEDILOL SCH EACH: 3.12 TABLET, FILM COATED ORAL at 11:37

## 2019-07-15 RX ADMIN — ASPIRIN SCH MG: 81 TABLET ORAL at 08:55

## 2019-07-15 RX ADMIN — DOCUSATE SODIUM 50 MG AND SENNOSIDES 8.6 MG SCH TAB: 8.6; 5 TABLET, FILM COATED ORAL at 08:55

## 2019-07-15 RX ADMIN — CARVEDILOL SCH EACH: 3.12 TABLET, FILM COATED ORAL at 22:41

## 2019-07-15 RX ADMIN — DOCUSATE SODIUM 50 MG AND SENNOSIDES 8.6 MG SCH TAB: 8.6; 5 TABLET, FILM COATED ORAL at 22:41

## 2019-07-15 RX ADMIN — BETAMETHASONE VALERATE SCH APPLIC: 1 CREAM TOPICAL at 09:01

## 2019-07-15 RX ADMIN — INSULIN GLARGINE SCH UNIT: 100 INJECTION, SOLUTION SUBCUTANEOUS at 08:59

## 2019-07-15 RX ADMIN — CARVEDILOL SCH EACH: 3.12 TABLET, FILM COATED ORAL at 08:57

## 2019-07-15 RX ADMIN — Medication SCH TAB: at 08:56

## 2019-07-15 RX ADMIN — BETAMETHASONE VALERATE SCH APPLIC: 1 CREAM TOPICAL at 22:42

## 2019-07-15 RX ADMIN — INSULIN LISPRO PRN UNIT: 100 INJECTION, SOLUTION INTRAVENOUS; SUBCUTANEOUS at 23:04

## 2019-07-15 RX ADMIN — CARVEDILOL SCH EACH: 3.12 TABLET, FILM COATED ORAL at 22:42

## 2019-07-15 RX ADMIN — Medication SCH TAB: at 22:41

## 2019-07-15 RX ADMIN — INSULIN LISPRO PRN UNIT: 100 INJECTION, SOLUTION INTRAVENOUS; SUBCUTANEOUS at 16:38

## 2019-07-15 RX ADMIN — INSULIN LISPRO PRN UNIT: 100 INJECTION, SOLUTION INTRAVENOUS; SUBCUTANEOUS at 12:09

## 2019-07-15 NOTE — HP
REASON FOR TRANSFER TO SWING BED:  Debilitation and weakness.



BRIEF SUMMARY OF HISTORY AND PHYSICAL:  The patient is an 86-year-old white female

who was admitted to [facility] on 06/30/2019 after patient

went into atrial fibrillation with a rapid ventricular response leading to acute

respiratory failure with hypoxia secondary to arrhythmia with a congestive heart

failure exacerbation.  While in the hospital, the patient underwent a radiofrequency

ablation on 07/08/2019 for refractory atrioventricular rufino reentry tachycardia and

she responded well to the procedure.  The patient also has a history of bacterial

and superinfection on chronic bullous pemphigoid wounds on her abdomen and chest

area for which she was treated with IV Levaquin.  The patient also had variations in

her blood sugar during her hospitalization, because of the prednisone that she was

given for her dermatological condition.  Since the patient was extremely weak and

debilitated, she was transferred from acute care at [facility] to [facility] for continued physical therapy. 



PAST MEDICAL HISTORY:  The patient has a history of:

1. Bullous pemphigoid.

2. The patient has a history of hypertension.

3. Atrial fibrillation as above, new onset.

4. Hyperlipidemia.

5. Diabetes mellitus, insulin dependent.

6. Hypothyroidism.



PAST SURGICAL HISTORY:  The patient has no significant past surgical history.



FAMILY HISTORY:  Positive for congestive heart failure.



SOCIAL HISTORY:  Denies tobacco, alcohol or social drug use.  She had been living

alone, recently moved in with her daughter with home health services after her

hospitalization in [place] for her dermatological condition.  The patient is

.  She has 5 grown children. 



REVIEW OF SYSTEMS:  Presently, patient denies any significant problems other than

the pain from her bullous pemphigoid lesions, does report generalized weakness.

Denies any further palpitations at this time.  The patient denies depression.  No

chest pain or shortness of breath.  Appetite has been good. 



PHYSICAL EXAMINATION:

VITAL SIGNS:  Blood pressure 142/82, respiratory rate was 20, heart rate was 82,

temperature 98.2, O2 saturation was 96% on room air. 

GENERAL:  White female, overtly alert and orient x3.  No apparent distress. 

HEENT:  Atraumatic, normocephalic.  Extraocular movements are intact.  Pupils are

equal, round, and reactive to light and accommodation.  Oropharynx, mucous membranes

were moist.  No exudate, discharge, nor lesions.  Screws are intact.  Pupils equal,

round, react to light accommodation oropharynx mucous membranes moist.  No exudates,

discharge, or lesions. 

NECK:  Supple.  No masses were palpated.  No bruits auscultated. 

CHEST:  Clear to auscultation bilaterally. 

HEART:  Presently regular rate and rhythm with occasional ectopic beats.   

ABDOMEN:  Soft, nontender, nondistended.  No masses were palpated. 

EXTREMITIES:  Show no cyanosis, clubbing, or edema.   

SKIN:  Showed denuded areas of emboli to the left groin area and under the breast.   

BACK:  No CVA tenderness.



ASSESSMENT AND PLAN:  Status post radiofrequency ablation for atrial fibrillation

with rapid ventricular response leading to acute respiratory failure and

exacerbation of congestive heart failure, presently improved.  The patient's heart

rate appears to be good at this time.  She underwent radiofrequency ablation, which

appears to have gone well. 



MEDICATIONS:  At this time, we will continue patient on her medications that she was

recently discharged from [facility] with, which will

include: 

1. Tylenol 60 mg p.o. q.6 hours.

2. Aspirin 81 mg daily.

3. Drisdol 1 capsule q.7 days.

4. Linzess 145 mg daily.

5. Metformin 500 mg p.o. b.i.d.

6. Niacin 100 mg p.o. b.i.d.

7. Prednisone 10 mg daily.

8. Senokot docusate 2 tabs p.o. b.i.d.

9. Triamcinolone to topical lesions b.i.d.

10. Verapamil 180 mg p.o. daily.  

11. The patient will take Vicodin 5/325 p.o. q.6 hours p.r.n. pain due to the

bullous pemphigoid. 



ACTIVITY:  Will be as tolerated.   



Physical therapy occupational therapy evaluation and treatment will be done.



DISPOSITION:  Expected disposition to be discharged to home once patient is

__________. 







Job ID:  [id number]

## 2019-07-16 RX ADMIN — Medication SCH TAB: at 08:17

## 2019-07-16 RX ADMIN — BETAMETHASONE VALERATE SCH APPLIC: 1 CREAM TOPICAL at 22:00

## 2019-07-16 RX ADMIN — INSULIN GLARGINE SCH UNIT: 100 INJECTION, SOLUTION SUBCUTANEOUS at 08:27

## 2019-07-16 RX ADMIN — DOCUSATE SODIUM 50 MG AND SENNOSIDES 8.6 MG SCH TAB: 8.6; 5 TABLET, FILM COATED ORAL at 22:00

## 2019-07-16 RX ADMIN — CARVEDILOL SCH EACH: 3.12 TABLET, FILM COATED ORAL at 22:01

## 2019-07-16 RX ADMIN — CARVEDILOL SCH EACH: 3.12 TABLET, FILM COATED ORAL at 22:02

## 2019-07-16 RX ADMIN — INSULIN LISPRO PRN UNIT: 100 INJECTION, SOLUTION INTRAVENOUS; SUBCUTANEOUS at 16:44

## 2019-07-16 RX ADMIN — DOCUSATE SODIUM 50 MG AND SENNOSIDES 8.6 MG SCH TAB: 8.6; 5 TABLET, FILM COATED ORAL at 08:17

## 2019-07-16 RX ADMIN — CARVEDILOL SCH EACH: 3.12 TABLET, FILM COATED ORAL at 08:19

## 2019-07-16 RX ADMIN — BETAMETHASONE VALERATE SCH APPLIC: 1 CREAM TOPICAL at 08:14

## 2019-07-16 RX ADMIN — ASPIRIN SCH MG: 81 TABLET ORAL at 08:18

## 2019-07-16 RX ADMIN — Medication SCH TAB: at 22:00

## 2019-07-17 RX ADMIN — INSULIN LISPRO PRN UNIT: 100 INJECTION, SOLUTION INTRAVENOUS; SUBCUTANEOUS at 20:37

## 2019-07-17 RX ADMIN — INSULIN GLARGINE SCH UNIT: 100 INJECTION, SOLUTION SUBCUTANEOUS at 09:05

## 2019-07-17 RX ADMIN — DOCUSATE SODIUM 50 MG AND SENNOSIDES 8.6 MG SCH TAB: 8.6; 5 TABLET, FILM COATED ORAL at 20:32

## 2019-07-17 RX ADMIN — DOCUSATE SODIUM 50 MG AND SENNOSIDES 8.6 MG SCH TAB: 8.6; 5 TABLET, FILM COATED ORAL at 09:08

## 2019-07-17 RX ADMIN — BETAMETHASONE VALERATE SCH APPLIC: 1 CREAM TOPICAL at 20:33

## 2019-07-17 RX ADMIN — Medication SCH TAB: at 20:32

## 2019-07-17 RX ADMIN — INSULIN LISPRO PRN UNIT: 100 INJECTION, SOLUTION INTRAVENOUS; SUBCUTANEOUS at 17:58

## 2019-07-17 RX ADMIN — CARVEDILOL SCH EACH: 3.12 TABLET, FILM COATED ORAL at 09:10

## 2019-07-17 RX ADMIN — Medication SCH TAB: at 09:08

## 2019-07-17 RX ADMIN — BETAMETHASONE VALERATE SCH APPLIC: 1 CREAM TOPICAL at 09:10

## 2019-07-17 RX ADMIN — CARVEDILOL SCH EACH: 3.12 TABLET, FILM COATED ORAL at 20:32

## 2019-07-17 RX ADMIN — ASPIRIN SCH MG: 81 TABLET ORAL at 09:08

## 2019-07-17 RX ADMIN — CARVEDILOL SCH EACH: 3.12 TABLET, FILM COATED ORAL at 20:33

## 2019-07-18 RX ADMIN — CARVEDILOL SCH EACH: 3.12 TABLET, FILM COATED ORAL at 21:49

## 2019-07-18 RX ADMIN — ASPIRIN SCH MG: 81 TABLET ORAL at 08:41

## 2019-07-18 RX ADMIN — Medication SCH TAB: at 08:41

## 2019-07-18 RX ADMIN — INSULIN LISPRO PRN UNIT: 100 INJECTION, SOLUTION INTRAVENOUS; SUBCUTANEOUS at 14:14

## 2019-07-18 RX ADMIN — INSULIN LISPRO PRN UNIT: 100 INJECTION, SOLUTION INTRAVENOUS; SUBCUTANEOUS at 22:49

## 2019-07-18 RX ADMIN — BETAMETHASONE VALERATE SCH APPLIC: 1 CREAM TOPICAL at 22:47

## 2019-07-18 RX ADMIN — INSULIN GLARGINE SCH UNIT: 100 INJECTION, SOLUTION SUBCUTANEOUS at 08:39

## 2019-07-18 RX ADMIN — DOCUSATE SODIUM 50 MG AND SENNOSIDES 8.6 MG SCH TAB: 8.6; 5 TABLET, FILM COATED ORAL at 08:41

## 2019-07-18 RX ADMIN — INSULIN LISPRO PRN UNIT: 100 INJECTION, SOLUTION INTRAVENOUS; SUBCUTANEOUS at 18:51

## 2019-07-18 RX ADMIN — Medication SCH TAB: at 21:46

## 2019-07-18 RX ADMIN — CARVEDILOL SCH EACH: 3.12 TABLET, FILM COATED ORAL at 08:48

## 2019-07-18 RX ADMIN — BETAMETHASONE VALERATE SCH APPLIC: 1 CREAM TOPICAL at 08:48

## 2019-07-18 RX ADMIN — DOCUSATE SODIUM 50 MG AND SENNOSIDES 8.6 MG SCH TAB: 8.6; 5 TABLET, FILM COATED ORAL at 21:46

## 2019-07-18 RX ADMIN — CARVEDILOL SCH EACH: 3.12 TABLET, FILM COATED ORAL at 21:47

## 2019-07-19 RX ADMIN — Medication SCH TAB: at 07:54

## 2019-07-19 RX ADMIN — BETAMETHASONE VALERATE SCH APPLIC: 1 CREAM TOPICAL at 07:57

## 2019-07-19 RX ADMIN — Medication SCH TAB: at 21:52

## 2019-07-19 RX ADMIN — CARVEDILOL SCH EACH: 3.12 TABLET, FILM COATED ORAL at 07:56

## 2019-07-19 RX ADMIN — DOCUSATE SODIUM 50 MG AND SENNOSIDES 8.6 MG SCH TAB: 8.6; 5 TABLET, FILM COATED ORAL at 21:52

## 2019-07-19 RX ADMIN — BETAMETHASONE VALERATE SCH APPLIC: 1 CREAM TOPICAL at 21:53

## 2019-07-19 RX ADMIN — INSULIN GLARGINE SCH UNIT: 100 INJECTION, SOLUTION SUBCUTANEOUS at 07:49

## 2019-07-19 RX ADMIN — INSULIN LISPRO PRN UNIT: 100 INJECTION, SOLUTION INTRAVENOUS; SUBCUTANEOUS at 21:54

## 2019-07-19 RX ADMIN — CARVEDILOL SCH EACH: 3.12 TABLET, FILM COATED ORAL at 21:51

## 2019-07-19 RX ADMIN — DOCUSATE SODIUM 50 MG AND SENNOSIDES 8.6 MG SCH TAB: 8.6; 5 TABLET, FILM COATED ORAL at 07:52

## 2019-07-19 RX ADMIN — INSULIN LISPRO PRN UNIT: 100 INJECTION, SOLUTION INTRAVENOUS; SUBCUTANEOUS at 17:40

## 2019-07-19 RX ADMIN — ASPIRIN SCH MG: 81 TABLET ORAL at 07:54

## 2019-07-20 RX ADMIN — BETAMETHASONE VALERATE SCH APPLIC: 1 CREAM TOPICAL at 09:24

## 2019-07-20 RX ADMIN — Medication SCH TAB: at 09:22

## 2019-07-20 RX ADMIN — DOCUSATE SODIUM 50 MG AND SENNOSIDES 8.6 MG SCH TAB: 8.6; 5 TABLET, FILM COATED ORAL at 09:22

## 2019-07-20 RX ADMIN — INSULIN LISPRO PRN UNIT: 100 INJECTION, SOLUTION INTRAVENOUS; SUBCUTANEOUS at 20:56

## 2019-07-20 RX ADMIN — DOCUSATE SODIUM 50 MG AND SENNOSIDES 8.6 MG SCH TAB: 8.6; 5 TABLET, FILM COATED ORAL at 20:57

## 2019-07-20 RX ADMIN — BETAMETHASONE VALERATE SCH APPLIC: 1 CREAM TOPICAL at 20:56

## 2019-07-20 RX ADMIN — CARVEDILOL SCH EACH: 3.12 TABLET, FILM COATED ORAL at 09:21

## 2019-07-20 RX ADMIN — ASPIRIN SCH MG: 81 TABLET ORAL at 09:23

## 2019-07-20 RX ADMIN — CARVEDILOL SCH EACH: 3.12 TABLET, FILM COATED ORAL at 20:58

## 2019-07-20 RX ADMIN — INSULIN GLARGINE SCH UNIT: 100 INJECTION, SOLUTION SUBCUTANEOUS at 09:18

## 2019-07-20 RX ADMIN — INSULIN LISPRO PRN UNIT: 100 INJECTION, SOLUTION INTRAVENOUS; SUBCUTANEOUS at 18:20

## 2019-07-20 RX ADMIN — Medication SCH TAB: at 20:57

## 2019-07-20 RX ADMIN — INSULIN LISPRO PRN UNIT: 100 INJECTION, SOLUTION INTRAVENOUS; SUBCUTANEOUS at 15:42

## 2019-07-21 RX ADMIN — CARVEDILOL SCH EACH: 3.12 TABLET, FILM COATED ORAL at 08:48

## 2019-07-21 RX ADMIN — DOCUSATE SODIUM 50 MG AND SENNOSIDES 8.6 MG SCH TAB: 8.6; 5 TABLET, FILM COATED ORAL at 08:46

## 2019-07-21 RX ADMIN — Medication SCH TAB: at 08:46

## 2019-07-21 RX ADMIN — INSULIN LISPRO PRN UNIT: 100 INJECTION, SOLUTION INTRAVENOUS; SUBCUTANEOUS at 20:53

## 2019-07-21 RX ADMIN — ASPIRIN SCH MG: 81 TABLET ORAL at 08:45

## 2019-07-21 RX ADMIN — CARVEDILOL SCH EACH: 3.12 TABLET, FILM COATED ORAL at 20:36

## 2019-07-21 RX ADMIN — Medication SCH TAB: at 20:35

## 2019-07-21 RX ADMIN — ERGOCALCIFEROL SCH MG: 1.25 CAPSULE, LIQUID FILLED ORAL at 11:34

## 2019-07-21 RX ADMIN — INSULIN GLARGINE SCH UNIT: 100 INJECTION, SOLUTION SUBCUTANEOUS at 08:43

## 2019-07-21 RX ADMIN — DOCUSATE SODIUM 50 MG AND SENNOSIDES 8.6 MG SCH TAB: 8.6; 5 TABLET, FILM COATED ORAL at 20:35

## 2019-07-21 RX ADMIN — BETAMETHASONE VALERATE SCH APPLIC: 1 CREAM TOPICAL at 20:37

## 2019-07-21 RX ADMIN — INSULIN LISPRO PRN UNIT: 100 INJECTION, SOLUTION INTRAVENOUS; SUBCUTANEOUS at 18:02

## 2019-07-21 RX ADMIN — BETAMETHASONE VALERATE SCH APPLIC: 1 CREAM TOPICAL at 16:54

## 2019-07-22 RX ADMIN — CARVEDILOL SCH EACH: 3.12 TABLET, FILM COATED ORAL at 09:35

## 2019-07-22 RX ADMIN — Medication SCH TAB: at 08:31

## 2019-07-22 RX ADMIN — INSULIN LISPRO PRN UNIT: 100 INJECTION, SOLUTION INTRAVENOUS; SUBCUTANEOUS at 18:21

## 2019-07-22 RX ADMIN — Medication SCH TAB: at 20:58

## 2019-07-22 RX ADMIN — ASPIRIN SCH MG: 81 TABLET ORAL at 08:32

## 2019-07-22 RX ADMIN — DOCUSATE SODIUM 50 MG AND SENNOSIDES 8.6 MG SCH TAB: 8.6; 5 TABLET, FILM COATED ORAL at 20:58

## 2019-07-22 RX ADMIN — INSULIN LISPRO PRN UNIT: 100 INJECTION, SOLUTION INTRAVENOUS; SUBCUTANEOUS at 14:29

## 2019-07-22 RX ADMIN — INSULIN GLARGINE SCH UNIT: 100 INJECTION, SOLUTION SUBCUTANEOUS at 09:31

## 2019-07-22 RX ADMIN — DOCUSATE SODIUM 50 MG AND SENNOSIDES 8.6 MG SCH TAB: 8.6; 5 TABLET, FILM COATED ORAL at 08:31

## 2019-07-22 RX ADMIN — BETAMETHASONE VALERATE SCH APPLIC: 1 CREAM TOPICAL at 20:58

## 2019-07-22 RX ADMIN — INSULIN LISPRO PRN UNIT: 100 INJECTION, SOLUTION INTRAVENOUS; SUBCUTANEOUS at 20:58

## 2019-07-22 RX ADMIN — CARVEDILOL SCH EACH: 3.12 TABLET, FILM COATED ORAL at 20:57

## 2019-07-22 RX ADMIN — BETAMETHASONE VALERATE SCH APPLIC: 1 CREAM TOPICAL at 09:38

## 2019-07-23 VITALS — DIASTOLIC BLOOD PRESSURE: 66 MMHG | SYSTOLIC BLOOD PRESSURE: 141 MMHG | TEMPERATURE: 98.1 F

## 2019-07-23 RX ADMIN — CARVEDILOL SCH EACH: 3.12 TABLET, FILM COATED ORAL at 08:49

## 2019-07-23 RX ADMIN — DOCUSATE SODIUM 50 MG AND SENNOSIDES 8.6 MG SCH TAB: 8.6; 5 TABLET, FILM COATED ORAL at 08:47

## 2019-07-23 RX ADMIN — Medication SCH TAB: at 08:46

## 2019-07-23 RX ADMIN — INSULIN GLARGINE SCH UNIT: 100 INJECTION, SOLUTION SUBCUTANEOUS at 08:57

## 2019-07-23 RX ADMIN — ASPIRIN SCH MG: 81 TABLET ORAL at 08:47

## 2019-07-23 RX ADMIN — BETAMETHASONE VALERATE SCH APPLIC: 1 CREAM TOPICAL at 09:01

## 2019-07-23 NOTE — DIS
DATE OF ADMISSION:  07/12/2019



DATE OF DISCHARGE:  07/23/2019



ADMISSION DIAGNOSES:  Physical deconditioning; bullous pemphigoid; atrial

fibrillation; type 2 diabetes mellitus, insulin dependent; hypothyroidism; 
diastolic

congestive heart failure. 



PROCEDURES:  Wound care and physical therapy.



HOSPITAL COURSE:  An 86-year-old female presented to our facility as a swing 
patient

to participate with physical therapy and occupational therapy status post

hospitalization at [facility] in [place], where she was

treated for atrial fibrillation with rapid ventricular response, which had led 
to

acute respiratory failure and hypoxia along with diastolic congestive heart 
failure

exacerbation.  While there, she underwent radiofrequency ablation on 07/08/2019 
for

refractory atrioventricular rufino reentry tachycardia.  The patient has a 
history of

chronic bullous pemphigoid and had subsequently developed wounds to her abdomen 
and

chest, for which she was treated with IV Levaquin.  Her condition was 
stabilized.

However, secondary to her underlying physical deconditioning, she was 
transitioned

here for PT and OT.  While here, the patient received regular wound care therapy

along with the aforementioned physical therapy and occupational therapy.  She 
has

steadily improved and is able to safely mobilize and transfer.  Secondary to her

prednisone taper, her glucose has trended high and she is on insulin for this 
and

will continue this for the foreseeable future until otherwise noted upon 
followup

with her primary care physician.  She feels well and is amenable to discharge 
home,

where she will have further care via [facility]. 



DISPOSITION:  The patient was discharged home and follow up with Dr. Torres, 
her

primary care provider, in one week.  She is to have [facility] for 
further

wound care along with PT and OT. 



DISCHARGE MEDICATIONS:  

1. Tylenol 650 mg q.6 hours p.r.n.

2. Aspirin 81 mg daily.

3. Betamethasone topically b.i.d.

4. Caltrate 600 plus vitamin D 1 tab b.i.d.

5. Ergocalciferol 1.25 mg as scheduled.

6. Lasix 20 mg daily.

7. Linzess 145 mcg daily.

8. Verapamil extended release 180 mg daily.

9. Lantus 45 units daily.

10. Levothyroxine 50 mcg daily.

11. Metformin 500 mg b.i.d.

12. Senokot 2 tabs b.i.d.







Job ID:  [id number]



[facility]

## 2019-08-04 ENCOUNTER — HOSPITAL ENCOUNTER (INPATIENT)
Dept: HOSPITAL 92 - ERS | Age: 84
LOS: 9 days | Discharge: HOME HEALTH | DRG: 300 | End: 2019-08-13
Attending: INTERNAL MEDICINE | Admitting: INTERNAL MEDICINE
Payer: MEDICARE

## 2019-08-04 VITALS — BODY MASS INDEX: 33 KG/M2

## 2019-08-04 DIAGNOSIS — T38.3X5A: ICD-10-CM

## 2019-08-04 DIAGNOSIS — D72.829: ICD-10-CM

## 2019-08-04 DIAGNOSIS — I11.0: ICD-10-CM

## 2019-08-04 DIAGNOSIS — Z79.52: ICD-10-CM

## 2019-08-04 DIAGNOSIS — E78.5: ICD-10-CM

## 2019-08-04 DIAGNOSIS — D64.9: ICD-10-CM

## 2019-08-04 DIAGNOSIS — T38.0X5A: ICD-10-CM

## 2019-08-04 DIAGNOSIS — E66.9: ICD-10-CM

## 2019-08-04 DIAGNOSIS — Z79.899: ICD-10-CM

## 2019-08-04 DIAGNOSIS — E11.65: ICD-10-CM

## 2019-08-04 DIAGNOSIS — Z79.4: ICD-10-CM

## 2019-08-04 DIAGNOSIS — Z88.2: ICD-10-CM

## 2019-08-04 DIAGNOSIS — L12.0: ICD-10-CM

## 2019-08-04 DIAGNOSIS — Z79.82: ICD-10-CM

## 2019-08-04 DIAGNOSIS — I50.32: ICD-10-CM

## 2019-08-04 DIAGNOSIS — Z79.01: ICD-10-CM

## 2019-08-04 DIAGNOSIS — E87.1: ICD-10-CM

## 2019-08-04 DIAGNOSIS — Z51.5: ICD-10-CM

## 2019-08-04 DIAGNOSIS — I48.0: ICD-10-CM

## 2019-08-04 DIAGNOSIS — Z88.8: ICD-10-CM

## 2019-08-04 DIAGNOSIS — E11.649: ICD-10-CM

## 2019-08-04 DIAGNOSIS — K59.00: ICD-10-CM

## 2019-08-04 DIAGNOSIS — I82.432: Primary | ICD-10-CM

## 2019-08-04 DIAGNOSIS — E03.9: ICD-10-CM

## 2019-08-04 LAB
ALBUMIN SERPL BCG-MCNC: 3.5 G/DL (ref 3.4–4.8)
ALP SERPL-CCNC: 134 U/L (ref 40–150)
ALT SERPL W P-5'-P-CCNC: 9 U/L (ref 8–55)
ANION GAP SERPL CALC-SCNC: 15 MMOL/L (ref 10–20)
APTT PPP: 25.7 SEC (ref 22.9–36.1)
AST SERPL-CCNC: 11 U/L (ref 5–34)
BASOPHILS # BLD AUTO: 0 THOU/UL (ref 0–0.2)
BASOPHILS NFR BLD AUTO: 0 % (ref 0–1)
BILIRUB SERPL-MCNC: 0.4 MG/DL (ref 0.2–1.2)
BUN SERPL-MCNC: 11 MG/DL (ref 9.8–20.1)
CALCIUM SERPL-MCNC: 9.1 MG/DL (ref 7.8–10.44)
CHLORIDE SERPL-SCNC: 100 MMOL/L (ref 98–107)
CO2 SERPL-SCNC: 24 MMOL/L (ref 23–31)
CREAT CL PREDICTED SERPL C-G-VRATE: 0 ML/MIN (ref 70–130)
EOSINOPHIL # BLD AUTO: 0.7 THOU/UL (ref 0–0.7)
EOSINOPHIL NFR BLD AUTO: 6.8 % (ref 0–10)
GLOBULIN SER CALC-MCNC: 2.7 G/DL (ref 2.4–3.5)
GLUCOSE SERPL-MCNC: 325 MG/DL (ref 83–110)
HGB BLD-MCNC: 12.5 G/DL (ref 12–16)
INR PPP: 0.9
LYMPHOCYTES # BLD: 2.8 THOU/UL (ref 1.2–3.4)
LYMPHOCYTES NFR BLD AUTO: 27.1 % (ref 21–51)
MCH RBC QN AUTO: 32.9 PG (ref 27–31)
MCV RBC AUTO: 99.3 FL (ref 78–98)
MONOCYTES # BLD AUTO: 0.5 THOU/UL (ref 0.11–0.59)
MONOCYTES NFR BLD AUTO: 4.6 % (ref 0–10)
NEUTROPHILS # BLD AUTO: 6.4 THOU/UL (ref 1.4–6.5)
NEUTROPHILS NFR BLD AUTO: 61.5 % (ref 42–75)
PLATELET # BLD AUTO: 446 THOU/UL (ref 130–400)
POTASSIUM SERPL-SCNC: 4.1 MMOL/L (ref 3.5–5.1)
PROTHROMBIN TIME: 12.2 SEC (ref 12–14.7)
RBC # BLD AUTO: 3.8 MILL/UL (ref 4.2–5.4)
SODIUM SERPL-SCNC: 135 MMOL/L (ref 136–145)
WBC # BLD AUTO: 10.3 THOU/UL (ref 4.8–10.8)

## 2019-08-04 PROCEDURE — 85379 FIBRIN DEGRADATION QUANT: CPT

## 2019-08-04 PROCEDURE — 87040 BLOOD CULTURE FOR BACTERIA: CPT

## 2019-08-04 PROCEDURE — 83605 ASSAY OF LACTIC ACID: CPT

## 2019-08-04 PROCEDURE — 36416 COLLJ CAPILLARY BLOOD SPEC: CPT

## 2019-08-04 PROCEDURE — 85025 COMPLETE CBC W/AUTO DIFF WBC: CPT

## 2019-08-04 PROCEDURE — 85730 THROMBOPLASTIN TIME PARTIAL: CPT

## 2019-08-04 PROCEDURE — 80053 COMPREHEN METABOLIC PANEL: CPT

## 2019-08-04 PROCEDURE — 83735 ASSAY OF MAGNESIUM: CPT

## 2019-08-04 PROCEDURE — 85610 PROTHROMBIN TIME: CPT

## 2019-08-04 PROCEDURE — 80048 BASIC METABOLIC PNL TOTAL CA: CPT

## 2019-08-04 PROCEDURE — 93005 ELECTROCARDIOGRAM TRACING: CPT

## 2019-08-04 PROCEDURE — 96372 THER/PROPH/DIAG INJ SC/IM: CPT

## 2019-08-04 PROCEDURE — 36415 COLL VENOUS BLD VENIPUNCTURE: CPT

## 2019-08-04 RX ADMIN — INSULIN GLARGINE SCH MLS: 100 INJECTION, SOLUTION SUBCUTANEOUS at 22:35

## 2019-08-04 NOTE — HP
CHIEF COMPLAINT:  Left leg pain.



HISTORY OF PRESENTING ILLNESS:  This is an 86-year-old female who was recently here

admitted to the Family Medicine Service and discharged to the rehab facility,

presenting to the hospital with left leg pain.  The patient apparently also has a

history of bullous pemphigoid disease, was brought into the ER, had ultrasound done

of her lower extremities, and she was found to have a left popliteal vein deep vein

thrombosis.  The patient states that other than her left leg pain, she has also

noted that her bullous disease has worsened recently.  She states that no matter

what happens she does not want any steroids to be given as it makes her blood sugars

worse.  She would just like some symptomatic control and anticoagulation for her

blood clot.  The patient states she has no alleviating or aggravating factors.  No

other complaints.  No other associated symptoms or issues.  The patient was seen and

examined in the ER.  No family at bedside.  All questions answered. 



ALLERGIES:  TO SULFA, DOES NOT KNOW WHAT HAPPENS WHEN SHE GETS SULFA.



PAST MEDICAL HISTORY:  Positive for;

1. Bullous pemphigoid.

2. Diabetes mellitus, type 2.

3. Hyperlipidemia.

4. Hypertension.

5. Hypothyroidism.

6. Obesity.



SOCIAL HISTORY:  Nondrinker and nonsmoker.



FAMILY HISTORY:  Noncontributory.



HOME MEDICATIONS:  See MAR.



REVIEW OF SYSTEMS:  All systems reviewed, pertinent positives in HPI, otherwise

negative. 



PHYSICAL EXAMINATION:

VITAL SIGNS:  Blood pressure 111/69, pulse 85, respiratory rate of 16, temperature

of 97.8, and O2 saturation 98% on room air. 

GENERAL:  The patient is lying in bed, in no acute discomfort. 

HEENT:  Pupils equal, round, and reactive to light and accommodation.  Extraocular

movements intact.  Oral cavity was moist and pink. 

NECK:  Supple, mobile, and nontender.  Thyroid appreciated. 

LUNGS:  Clear to auscultation bilaterally. 

HEART:  Regular rate and rhythm.  S1 and S2.  No murmurs, rubs, or gallops

appreciated. 

ABDOMEN:  Positive bowel sounds.  Soft, nontender, and nondistended. 

EXTREMITIES:  2+ pulses positive bilaterally.  Right lower extremity has trace

edema.  Left lower extremity has 2+ pitting edema. 

NEUROLOGIC:  Cranial nerves 2 through 12 intact.  No loss of motor or sensory

function. 

SKIN:  Multiple bullous lesions throughout the body around the left upper extremity,

left lower extremity, as well as swelling in the left lower extremity. 



LABORATORY DATA:  Reviewed.



IMAGING STUDIES:  Reviewed.



ASSESSMENT:  

1. New onset left lower extremity deep vein thrombosis.

2. Hypertension.

3. Hyperlipidemia.

4. Diabetes mellitus, type 2.

5. Bullous pemphigoid disease.

6. Hypothyroidism.

7. Anemia.



PLAN:  At this point in time, we will admit the patient to Internal Medicine Team.

Start the patient on full-dose Lovenox for DVT treatment.  We will switch her over

to p.o. in the next few days.  Wound care for her bullous pemphigoid disease. 



The patient stated that she does not want any steroids whatsoever.  However, we will

continue with prednisone 5 mg daily that she has been taking for a long period of

time to avoid adrenal crisis.  She does state that she is refusing to take any

higher level IV steroids. 



Continue with Lasix. 



Aspirin as well as atorvastatin for hypertension and hyperlipidemia. 



We will obtain wound care for bullous pemphigoid as mentioned earlier. 



Labs in the morning.  We will monitor for the next 24-48 hours, can likely switch

over to p.o. anticoagulation at the point in time of discharge.  Case and plan

discussed with the patient at length.  She understood and agreed with this plan.

There was no family at the bedside. 







Job ID:  [id number]

## 2019-08-04 NOTE — ULT
Exam: Left lower extremity venous ultrasound with Doppler



HISTORY: Pain.



COMPARISON: none



TECHNIQUE: Grayscale, color flow, Doppler imaging and spectral wave analysis performed left lower ext
remity venous system



FINDINGS:

There is compressibility, presence of flow and augmentation in the common femoral vein, and femoral v
ein. There is incomplete compressibility with evidence of echogenic material and partial flow in

the popliteal vein, suggesting nonocclusive thrombus. Profunda femoral vein, greater saphenous vein a
nd posterior tibial vein are patent



IMPRESSION: Nonocclusive thrombus in the left popliteal vein.



Reported By: Kaela Pendleton 

Electronically Signed:  8/4/2019 12:55 PM

## 2019-08-05 LAB
ANION GAP SERPL CALC-SCNC: 13 MMOL/L (ref 10–20)
BASOPHILS # BLD AUTO: 0 THOU/UL (ref 0–0.2)
BASOPHILS NFR BLD AUTO: 0.2 % (ref 0–1)
BUN SERPL-MCNC: 11 MG/DL (ref 9.8–20.1)
CALCIUM SERPL-MCNC: 9 MG/DL (ref 7.8–10.44)
CHLORIDE SERPL-SCNC: 105 MMOL/L (ref 98–107)
CO2 SERPL-SCNC: 25 MMOL/L (ref 23–31)
CREAT CL PREDICTED SERPL C-G-VRATE: 69 ML/MIN (ref 70–130)
EOSINOPHIL # BLD AUTO: 0.9 THOU/UL (ref 0–0.7)
EOSINOPHIL NFR BLD AUTO: 7.2 % (ref 0–10)
GLUCOSE SERPL-MCNC: 150 MG/DL (ref 83–110)
HGB BLD-MCNC: 12.5 G/DL (ref 12–16)
LYMPHOCYTES # BLD: 3.9 THOU/UL (ref 1.2–3.4)
LYMPHOCYTES NFR BLD AUTO: 32.8 % (ref 21–51)
MCH RBC QN AUTO: 32.5 PG (ref 27–31)
MCV RBC AUTO: 98.5 FL (ref 78–98)
MONOCYTES # BLD AUTO: 0.6 THOU/UL (ref 0.11–0.59)
MONOCYTES NFR BLD AUTO: 5.1 % (ref 0–10)
NEUTROPHILS # BLD AUTO: 6.6 THOU/UL (ref 1.4–6.5)
NEUTROPHILS NFR BLD AUTO: 54.7 % (ref 42–75)
PLATELET # BLD AUTO: 476 THOU/UL (ref 130–400)
POTASSIUM SERPL-SCNC: 3.8 MMOL/L (ref 3.5–5.1)
RBC # BLD AUTO: 3.85 MILL/UL (ref 4.2–5.4)
SODIUM SERPL-SCNC: 139 MMOL/L (ref 136–145)
WBC # BLD AUTO: 12 THOU/UL (ref 4.8–10.8)

## 2019-08-05 RX ADMIN — INSULIN LISPRO PRN UNIT: 100 INJECTION, SOLUTION INTRAVENOUS; SUBCUTANEOUS at 20:47

## 2019-08-05 RX ADMIN — DOCUSATE SODIUM 50 MG AND SENNOSIDES 8.6 MG SCH: 8.6; 5 TABLET, FILM COATED ORAL at 20:49

## 2019-08-05 RX ADMIN — ASPIRIN SCH MG: 81 TABLET ORAL at 08:36

## 2019-08-05 RX ADMIN — INSULIN GLARGINE SCH MLS: 100 INJECTION, SOLUTION SUBCUTANEOUS at 20:45

## 2019-08-05 RX ADMIN — INSULIN LISPRO PRN UNIT: 100 INJECTION, SOLUTION INTRAVENOUS; SUBCUTANEOUS at 18:07

## 2019-08-05 RX ADMIN — DOCUSATE SODIUM 50 MG AND SENNOSIDES 8.6 MG SCH TAB: 8.6; 5 TABLET, FILM COATED ORAL at 08:36

## 2019-08-05 NOTE — PDOC.HOSPP
- Subjective


Subjective: 


85 y/o female with HTN, DM and bullous pemphigoid admitted with left leg pain 

and found to have DVT. She also reported acute bullous eruptions involving 

whole body except face.No fever or SOB.





- Objective


Vital Signs & Weight: 


 Vital Signs (12 hours)











  Temp Pulse Resp BP Pulse Ox


 


 08/05/19 08:00  98.1 F  97  18  146/61 H  99


 


 08/05/19 03:25   94  16  119/92 H  95


 


 08/05/19 00:00  98 F  98  25 H  142/67 H  96








 Weight











Weight                         208 lb 5.389 oz














I&O: 


 











 08/04/19 08/05/19 08/06/19





 06:59 06:59 06:59


 


Intake Total  300 


 


Balance  300 











Result Diagrams: 


 08/05/19 04:20





 08/05/19 04:20


Additional Labs: 


 Accuchecks











  08/05/19 08/05/19 08/04/19





  11:05 06:26 21:04


 


POC Glucose  250 H  124 H  192 H














  08/04/19





  16:59


 


POC Glucose  171 H














ROS





- Review of Systems


All systems: 


All other ROS were reviewed and found negative.








- Medication


Medications: 


Active Medications











Generic Name Dose Route Start Last Admin





  Trade Name Freq  PRN Reason Stop Dose Admin


 


Acetaminophen  650 mg  08/04/19 14:23  08/05/19 00:54





  Tylenol  PO   650 mg





  Q4H PRN   Administration





  Headache/Fever/Mild Pain (1-3)   





     





     





     


 


Aspirin  81 mg  08/05/19 09:00  08/05/19 08:36





  Ecotrin  PO   81 mg





  QAM TACO   Administration





     





     





     





     


 


Atorvastatin Calcium  20 mg  08/04/19 21:00  08/04/19 22:35





  Lipitor  PO   20 mg





  HS TACO   Administration





     





     





     





     


 


Diphenhydramine HCl  25 mg  08/05/19 00:46  08/05/19 00:55





  Benadryl  PO   25 mg





  Q6H PRN   Administration





  Itching & Insomnia   





     





     





     


 


Enoxaparin Sodium  80 mg  08/04/19 21:00  08/05/19 08:36





  Lovenox  SC   80 mg





  0900,2100 TACO   Administration





     





     





     





     


 


Furosemide  20 mg  08/05/19 09:00  08/05/19 08:36





  Lasix  PO   20 mg





  QAM TACO   Administration





     





     





     





     


 


Insulin Glargine 5 units/  0.05 mls @ 0 mls/hr  08/04/19 21:00  08/04/19 22:35





  Miscellaneous Medication  SC   0.05 mls





  HS TACO   Administration





     





     





     





     


 


Levothyroxine Sodium  50 mcg  08/05/19 09:00  08/05/19 08:36





  Synthroid  PO   50 mcg





  DAILY TACO   Administration





     





     





     





     


 


Prednisone  5 mg  08/05/19 08:00  08/05/19 08:36





  Prednisone  PO   5 mg





  QAM-WM ATCO   Administration





     





     





     





     


 


Senna/Docusate Sodium  1 tab  08/05/19 09:00  08/05/19 08:36





  Senokot S  PO   1 tab





  BID TACO   Administration





     





     





     





     


 


Zolpidem Tartrate  5 mg  08/04/19 14:23  08/05/19 00:54





  Ambien  PO   5 mg





  HSPRN PRN   Administration





  Insomnia   





     





     





     














- Exam


awake alert (obese)


Eye: anicteric sclera


ENT: normocephalic atraumatic, no oropharyngeal lesions, moist mucosa


Neck: supple, symmetric


Heart: RRR


Respiratory: no wheezes, no rales, no ronchi (Fair air entry bilaterally)


Gastrointestinal: soft, non-tender, non-distended, normal bowel sounds


Extremities: 2+ LE edema (Left leg)


Skin: normal turgor (scattered bullous erution involving the whole body except 

face.Also noted were erythemaouts pappules and plaques on the body)


Neurological: CN's grossly intact, no focal deficits


Psychiatric: normal affect, A&O x 3





Hosp A/P


(1) Deep vein thrombosis (DVT) of left lower extremity


Code(s): I82.402 - ACUTE EMBOLISM AND THOMBOS UNSP DEEP VEINS OF L LOW EXTREM   

Status: Acute   





(2) Constipation


Code(s): K59.00 - CONSTIPATION, UNSPECIFIED   Status: Acute   





(3) Bullous pemphigoid


Code(s): L12.0 - BULLOUS PEMPHIGOID   Status: Chronic   





(4) Congestive heart failure with left ventricular diastolic dysfunction


Code(s): I50.30 - UNSPECIFIED DIASTOLIC (CONGESTIVE) HEART FAILURE   Status: 

Chronic   


Qualifiers: 


   Congestive heart failure chronicity: acute   Qualified Code(s): I50.31 - 

Acute diastolic (congestive) heart failure   





(5) HLD (hyperlipidemia)


Code(s): E78.5 - HYPERLIPIDEMIA, UNSPECIFIED   Status: Chronic   





(6) HTN (hypertension)


Code(s): I10 - ESSENTIAL (PRIMARY) HYPERTENSION   Status: Chronic   


Qualifiers: 


   Hypertension type: essential hypertension   Qualified Code(s): I10 - 

Essential (primary) hypertension   





(7) Hypothyroidism


Code(s): E03.9 - HYPOTHYROIDISM, UNSPECIFIED   Status: Chronic   





(8) Insulin dependent diabetes mellitus


Code(s): E11.9 - TYPE 2 DIABETES MELLITUS WITHOUT COMPLICATIONS; Z79.4 - LONG 

TERM (CURRENT) USE OF INSULIN   Status: Chronic   





(9) Atrial fibrillation with RVR


Code(s): I48.91 - UNSPECIFIED ATRIAL FIBRILLATION   Status: Resolved   





- Plan


Apply clobetasol cream to whole body.


Continue Loven anticoagulation


Restart verapamil and miralax.


Monitor BP and adjust treatment.


Get repeat CBC and BMP in the am.

## 2019-08-06 RX ADMIN — INSULIN GLARGINE SCH MLS: 100 INJECTION, SOLUTION SUBCUTANEOUS at 20:43

## 2019-08-06 RX ADMIN — INSULIN LISPRO PRN UNIT: 100 INJECTION, SOLUTION INTRAVENOUS; SUBCUTANEOUS at 08:12

## 2019-08-06 RX ADMIN — INSULIN LISPRO PRN UNIT: 100 INJECTION, SOLUTION INTRAVENOUS; SUBCUTANEOUS at 18:34

## 2019-08-06 RX ADMIN — INSULIN LISPRO PRN UNIT: 100 INJECTION, SOLUTION INTRAVENOUS; SUBCUTANEOUS at 12:09

## 2019-08-06 RX ADMIN — ASPIRIN SCH MG: 81 TABLET ORAL at 10:00

## 2019-08-06 RX ADMIN — INSULIN LISPRO PRN UNIT: 100 INJECTION, SOLUTION INTRAVENOUS; SUBCUTANEOUS at 20:45

## 2019-08-06 RX ADMIN — DOCUSATE SODIUM 50 MG AND SENNOSIDES 8.6 MG SCH TAB: 8.6; 5 TABLET, FILM COATED ORAL at 20:42

## 2019-08-06 RX ADMIN — DOCUSATE SODIUM 50 MG AND SENNOSIDES 8.6 MG SCH TAB: 8.6; 5 TABLET, FILM COATED ORAL at 10:02

## 2019-08-06 NOTE — PDOC.PALCO
Palliative Care Consult





- Consult Details


Requesting Physician: Dr Stevens


Reason for Consult: goals of care, symptom management


Family Members Present: Niece / Spoke with daughter via phone





- Pertinent HPI





86 year old female who initially presented with a rash and fluid filled lesions 

in May to her breasts and abdomen that were identified as Bullous pemphigoid 

disease by Dr Jones in [place]. Patient was transferred to [facility] for 

management/treatment with high dose of steroid and has been on a taper. She was 

also treated topically with 0/05% Clobelasol Cream. Daughter states skin 

improved, however they have had other chronic disease processes such as cardiac 

issues and patient was transferred to a swing bed in [place] to gain strength 

prior to returning home. Onset of left leg pain with increase in presence of 

Bullous phemphigoid that are more pronounced than onset in May led patient to 

the emergency room for evaluation and subsequent admission secondary to DVT and 

severity of Bullous Phemphigoid. 





- Pertinent PMH





Bullous Pemphigoid, Diabetes Mellitus II, Hyperlipidemia, hypertension, 

hypothyroid, obesity, DVT





- Social History


Smoking Status: Never smoker


Smoking: no tobacco exposure


Alcohol Use: none


Drug Use History: none


Living Situation: other (Recently was at rehab to gain strength prior to 

returning home)





- Medications


MAR Reviewed: Yes





- Allergies


Allergies/Adverse Reactions: 


 Allergies











Allergy/AdvReac Type Severity Reaction Status Date / Time


 


glipizide Allergy   Verified 06/30/19 16:10


 


Sulfa (Sulfonamide Allergy   Verified 06/30/19 16:09





Antibiotics)     














- Subjective





Awake, alert x3. Fair historian. In recliner to bedside secondary to increase 

in severity of rash/bollus wounds to all of body except face.





ROS: Itching, pain to open wounds, pain to left lower extremity. Denies chest 

pain, shortness of breath, nausea, diarrhea, alteration in vision, no breakdown/

lesions to oral mucousa. 





- Objective


Vital Signs: 


 Vital Signs - Most Recent











Temp Pulse Resp BP Pulse Ox


 


 97.6 F   95   20   137/73   98 


 


 08/06/19 12:43  08/06/19 12:43  08/06/19 12:43  08/06/19 12:43  08/06/19 12:43











Palliative Performance Scale: 40





- Physical Exam


Constitutional: NAD


HEENT: moist MMs, EOMI


Respiratory: wheezing present (Wheezing to upper lobes on expiration that is 

minimal)


Deviation from normal: Mild labored respirations with increase in activity 

observed


Cardiovascular: RRR


Gastrointestinal: soft, non-tender, positive bowel sounds


Musculoskeletal: edema present


Deviation from normal: More pronounced to left lower extremity


Psychiatric: A&O x 3


Deviation from normal: Mildly anxious related to increase in skin lesions


Deviation from normal: Multiple bullous lesions both open and closed to 

extremities and trunk





- Problem List


(1) Palliative care encounter


Code(s): Z51.5 - ENCOUNTER FOR PALLIATIVE CARE   Current Visit: Yes   Status: 

Acute   





(2) Deep vein thrombosis (DVT) of left lower extremity


Code(s): I82.402 - ACUTE EMBOLISM AND THOMBOS UNSP DEEP VEINS OF L LOW EXTREM   

Current Visit: Yes   Status: Acute   


Qualifiers: 


   Chronicity: acute 





(3) Bullous pemphigoid


Code(s): L12.0 - BULLOUS PEMPHIGOID   Current Visit: No   Status: Acute   





- Plan/Recommendations


Plan:


Discussed history with patient and patient daughter Jamee. Dr Jones has seen 

patient in the past  [phone]. Previously treated with high dose of 

prednisone with taper, patient does not desire to have steroids again, however 

the severity of presentation of the Bullous Pemphigoid may call for further 

discussion with patient and family. Currently Dr Stevens has ordered topical 

Clobelasol to manage skin breakdown. 





*Schedule Benadryl to decrease itching 


* Follow up 8/7 to assist with patient support and complex decision making.


























[80] minutes spent on this encounter with >50% of the time in counseling and 

coordination of care.





Thank you for this very appropriate consult.

## 2019-08-06 NOTE — PDOC.HOSPP
- Subjective


Subjective: 


87 y/o female with HTN, DM and bullous pemphigoid admitted with left leg pain 

and found to have DVT. She also reported acute bullous eruptions involving 

whole body except face. No new problem. No fever or SOB.





- Objective


Vital Signs & Weight: 


 Vital Signs (12 hours)











  Temp Pulse Resp BP Pulse Ox


 


 08/06/19 12:43  97.6 F  95  20  137/73  98


 


 08/06/19 07:30  97.6 F  87  16  131/61  98


 


 08/06/19 04:00  98.3 F  94  16  119/58 L  96


 


 08/06/19 02:17      94 L








 Weight











Admit Weight                   199 lb


 


Weight                         194 lb














I&O: 


 











 08/05/19 08/06/19 08/07/19





 06:59 06:59 06:59


 


Intake Total 300 1010 718


 


Output Total  400 


 


Balance 300 610 718











Result Diagrams: 


 08/05/19 04:20





 08/05/19 04:20


Additional Labs: 


 Accuchecks











  08/06/19 08/06/19 08/05/19





  11:47 05:57 20:20


 


POC Glucose  324 H  188 H  319 H














  08/05/19





  17:09


 


POC Glucose  380 H














ROS





- Review of Systems


All systems: 


All other ROS were reviewed and found negative.








- Medication


Medications: 


Active Medications











Generic Name Dose Route Start Last Admin





  Trade Name Freq  PRN Reason Stop Dose Admin


 


Acetaminophen  650 mg  08/04/19 14:23  08/05/19 20:57





  Tylenol  PO   650 mg





  Q4H PRN   Administration





  Headache/Fever/Mild Pain (1-3)   





     





     





     


 


Aspirin  81 mg  08/05/19 09:00  08/06/19 10:00





  Ecotrin  PO   81 mg





  QAM TACO   Administration





     





     





     





     


 


Atorvastatin Calcium  20 mg  08/04/19 21:00  08/05/19 20:45





  Lipitor  PO   20 mg





  HS TACO   Administration





     





     





     





     


 


Clobetasol Propionate  0 gm  08/05/19 21:00  08/06/19 10:15





  Temovate 0.05% Cream  TOP   1 applic





  BID TACO   Administration





     





     





     





     


 


Diphenhydramine HCl  25 mg  08/05/19 00:46  08/06/19 12:09





  Benadryl  PO   25 mg





  Q6H PRN   Administration





  Itching & Insomnia   





     





     





     


 


Enoxaparin Sodium  80 mg  08/04/19 21:00  08/06/19 10:01





  Lovenox  SC  08/06/19 23:00  80 mg





  0900,2100 TACO   Administration





     





     





     





     


 


Furosemide  20 mg  08/05/19 09:00  08/06/19 10:01





  Lasix  PO   20 mg





  QAM TACO   Administration





     





     





     





     


 


Insulin Glargine 5 units/  0.05 mls @ 0 mls/hr  08/04/19 21:00  08/05/19 20:45





  Miscellaneous Medication  SC   0.05 mls





  HS TACO   Administration





     





     





     





     


 


Insulin Human Lispro  0 units  08/04/19 14:29  08/06/19 12:09





  Humalog  SC   5 unit





  .MILD SLIDING SCALE PRN   Administration





  Mild Correctional Scale   





     





     





     


 


Insulin Human Lispro  0 units  08/04/19 14:29  08/05/19 20:47





  Humalog  SC   4 unit





  .BEDTIME SLIDING SC PRN   Administration





  Bedtime Correctional Scale   





     





     





     


 


Levothyroxine Sodium  50 mcg  08/05/19 09:00  08/06/19 10:01





  Synthroid  PO   50 mcg





  DAILY TACO   Administration





     





     





     





     


 


Polyethylene Glycol  34 gm  08/06/19 09:00  08/06/19 10:02





  Miralax  PO   34 gm





  DAILY TACO   Administration





     





     





     





     


 


Prednisone  5 mg  08/05/19 08:00  08/06/19 09:59





  Prednisone  PO   5 mg





  QAM-WM TACO   Administration





     





     





     





     


 


Senna/Docusate Sodium  1 tab  08/05/19 09:00  08/06/19 10:02





  Senokot S  PO   1 tab





  BID TACO   Administration





     





     





     





     


 


Verapamil HCl  180 mg  08/05/19 21:00  08/05/19 20:45





  Calan Er  PO   180 mg





  HS TACO   Administration





     





     





     





     


 


Zolpidem Tartrate  5 mg  08/04/19 14:23  08/05/19 00:54





  Ambien  PO   5 mg





  HSPRN PRN   Administration





  Insomnia   





     





     





     














- Exam


awake alert


Eye: anicteric sclera


ENT: normocephalic atraumatic, moist mucosa


Neck: supple, no JVD


Heart: RRR


Respiratory: no wheezes, no rales, no ronchi


Gastrointestinal: soft, non-tender, non-distended, normal bowel sounds


Extremities: no cyanosis, 2+ LE edema (Left lower extremity edema)


Skin: normal turgor (multiple scattered bullous eruptions as well as scattered 

pappules and plaques)


Neurological: CN's grossly intact, no focal deficits


Psychiatric: normal affect, A&O x 3





Hosp A/P


(1) Deep vein thrombosis (DVT) of left lower extremity


Code(s): I82.402 - ACUTE EMBOLISM AND THOMBOS UNSP DEEP VEINS OF L LOW EXTREM   

Status: Acute   





(2) Constipation


Code(s): K59.00 - CONSTIPATION, UNSPECIFIED   Status: Acute   





(3) Bullous pemphigoid


Code(s): L12.0 - BULLOUS PEMPHIGOID   Status: Chronic   





(4) Congestive heart failure with left ventricular diastolic dysfunction


Code(s): I50.30 - UNSPECIFIED DIASTOLIC (CONGESTIVE) HEART FAILURE   Status: 

Chronic   


Qualifiers: 


   Congestive heart failure chronicity: acute   Qualified Code(s): I50.31 - 

Acute diastolic (congestive) heart failure   





(5) HLD (hyperlipidemia)


Code(s): E78.5 - HYPERLIPIDEMIA, UNSPECIFIED   Status: Chronic   





(6) HTN (hypertension)


Code(s): I10 - ESSENTIAL (PRIMARY) HYPERTENSION   Status: Chronic   


Qualifiers: 


   Hypertension type: essential hypertension   Qualified Code(s): I10 - 

Essential (primary) hypertension   





(7) Hypothyroidism


Code(s): E03.9 - HYPOTHYROIDISM, UNSPECIFIED   Status: Chronic   





(8) Insulin dependent diabetes mellitus


Code(s): E11.9 - TYPE 2 DIABETES MELLITUS WITHOUT COMPLICATIONS; Z79.4 - LONG 

TERM (CURRENT) USE OF INSULIN   Status: Chronic   





- Plan


Transition lovenox to eliquis.


Continue topical clobetasol


Consult rheumatology.


Continue other treatments.

## 2019-08-07 LAB
ALBUMIN SERPL BCG-MCNC: 3.5 G/DL (ref 3.4–4.8)
ALP SERPL-CCNC: 142 U/L (ref 40–150)
ALT SERPL W P-5'-P-CCNC: 9 U/L (ref 8–55)
ANION GAP SERPL CALC-SCNC: 17 MMOL/L (ref 10–20)
AST SERPL-CCNC: 12 U/L (ref 5–34)
BASOPHILS # BLD AUTO: 0 THOU/UL (ref 0–0.2)
BASOPHILS NFR BLD AUTO: 0.1 % (ref 0–1)
BILIRUB SERPL-MCNC: 0.5 MG/DL (ref 0.2–1.2)
BUN SERPL-MCNC: 16 MG/DL (ref 9.8–20.1)
CALCIUM SERPL-MCNC: 8.7 MG/DL (ref 7.8–10.44)
CHLORIDE SERPL-SCNC: 98 MMOL/L (ref 98–107)
CO2 SERPL-SCNC: 19 MMOL/L (ref 23–31)
CREAT CL PREDICTED SERPL C-G-VRATE: 56 ML/MIN (ref 70–130)
EOSINOPHIL # BLD AUTO: 1.4 THOU/UL (ref 0–0.7)
EOSINOPHIL NFR BLD AUTO: 10.4 % (ref 0–10)
GLOBULIN SER CALC-MCNC: 3.1 G/DL (ref 2.4–3.5)
GLUCOSE SERPL-MCNC: 282 MG/DL (ref 83–110)
HGB BLD-MCNC: 14 G/DL (ref 12–16)
LYMPHOCYTES # BLD: 3.8 THOU/UL (ref 1.2–3.4)
LYMPHOCYTES NFR BLD AUTO: 28.9 % (ref 21–51)
MCH RBC QN AUTO: 32.5 PG (ref 27–31)
MCV RBC AUTO: 99.4 FL (ref 78–98)
MONOCYTES # BLD AUTO: 0.6 THOU/UL (ref 0.11–0.59)
MONOCYTES NFR BLD AUTO: 4.3 % (ref 0–10)
NEUTROPHILS # BLD AUTO: 7.5 THOU/UL (ref 1.4–6.5)
NEUTROPHILS NFR BLD AUTO: 56.2 % (ref 42–75)
PLATELET # BLD AUTO: 451 THOU/UL (ref 130–400)
POTASSIUM SERPL-SCNC: 4 MMOL/L (ref 3.5–5.1)
RBC # BLD AUTO: 4.3 MILL/UL (ref 4.2–5.4)
SODIUM SERPL-SCNC: 130 MMOL/L (ref 136–145)
WBC # BLD AUTO: 13.3 THOU/UL (ref 4.8–10.8)

## 2019-08-07 RX ADMIN — INSULIN GLARGINE SCH MLS: 100 INJECTION, SOLUTION SUBCUTANEOUS at 20:49

## 2019-08-07 RX ADMIN — DOCUSATE SODIUM 50 MG AND SENNOSIDES 8.6 MG SCH TAB: 8.6; 5 TABLET, FILM COATED ORAL at 09:45

## 2019-08-07 RX ADMIN — ASPIRIN SCH MG: 81 TABLET ORAL at 09:44

## 2019-08-07 RX ADMIN — HYDROCODONE BITARTRATE AND ACETAMINOPHEN PRN TAB: 7.5; 325 TABLET ORAL at 02:51

## 2019-08-07 RX ADMIN — INSULIN LISPRO PRN UNIT: 100 INJECTION, SOLUTION INTRAVENOUS; SUBCUTANEOUS at 20:51

## 2019-08-07 RX ADMIN — HYDROCODONE BITARTRATE AND ACETAMINOPHEN PRN TAB: 7.5; 325 TABLET ORAL at 11:37

## 2019-08-07 RX ADMIN — INSULIN LISPRO PRN UNIT: 100 INJECTION, SOLUTION INTRAVENOUS; SUBCUTANEOUS at 17:47

## 2019-08-07 RX ADMIN — DOCUSATE SODIUM 50 MG AND SENNOSIDES 8.6 MG SCH TAB: 8.6; 5 TABLET, FILM COATED ORAL at 20:49

## 2019-08-07 RX ADMIN — INSULIN LISPRO PRN UNIT: 100 INJECTION, SOLUTION INTRAVENOUS; SUBCUTANEOUS at 11:41

## 2019-08-07 NOTE — PDOC.HOSPP
- Subjective


Encounter Date: 08/07/19


Encounter Time: 10:36


Subjective: 


85 y/o female with HTN, DM and bullous pemphigoid admitted with left leg pain 

and worsening bullous eruption on the skin. Found to have DVT. eruptions are 

getting worse and painful. No fever. Patient who declined systemic steroid on 

presentation now is open to it.





- Objective


Vital Signs & Weight: 


 Vital Signs (12 hours)











  Temp Pulse Resp BP Pulse Ox


 


 08/07/19 03:53  97.5 F L  87  18  123/89  96








 Weight











Admit Weight                   199 lb


 


Weight                         195 lb 1.6 oz














I&O: 


 











 08/06/19 08/07/19 08/08/19





 06:59 06:59 06:59


 


Intake Total 1010 1478 


 


Output Total 400 240 


 


Balance 610 1238 











Result Diagrams: 


 08/07/19 04:23





 08/07/19 04:23


Additional Labs: 


 Accuchecks











  08/07/19 08/07/19 08/06/19





  11:12 06:01 20:08


 


POC Glucose  375 H  279 H  348 H














  08/06/19





  18:15


 


POC Glucose  336 H














ROS





- Medication


Medications: 


Active Medications











Generic Name Dose Route Start Last Admin





  Trade Name Freq  PRN Reason Stop Dose Admin


 


Acetaminophen  650 mg  08/04/19 14:23  08/06/19 21:08





  Tylenol  PO   650 mg





  Q4H PRN   Administration





  Headache/Fever/Mild Pain (1-3)   





     





     





     


 


Hydrocodone Bitart/Acetaminophen  1 tab  08/04/19 14:23  08/07/19 11:37





  Norco 7.5/325  PO   1 tab





  Q4H PRN   Administration





  Moderate Pain (4-6)   





     





     





     


 


Apixaban  5 mg  08/07/19 09:00  08/07/19 09:44





  Eliquis  PO   5 mg





  BID TACO   Administration





     





     





     





     


 


Aspirin  81 mg  08/05/19 09:00  08/07/19 09:44





  Ecotrin  PO   81 mg





  QAM TACO   Administration





     





     





     





     


 


Atorvastatin Calcium  20 mg  08/04/19 21:00  08/06/19 20:42





  Lipitor  PO   20 mg





  HS TACO   Administration





     





     





     





     


 


Diphenhydramine HCl  25 mg  08/06/19 22:00  08/07/19 06:34





  Benadryl  PO   25 mg





  Q8HR TACO   Administration





     





     





     





     


 


Furosemide  20 mg  08/05/19 09:00  08/07/19 09:45





  Lasix  PO   20 mg





  QAM TACO   Administration





     





     





     





     


 


Insulin Human Lispro  0 units  08/04/19 14:29  08/07/19 11:41





  Humalog  SC   6 unit





  .MILD SLIDING SCALE PRN   Administration





  Mild Correctional Scale   





     





     





     


 


Insulin Human Lispro  0 units  08/04/19 14:29  08/06/19 20:45





  Humalog  SC   4 unit





  .BEDTIME SLIDING SC PRN   Administration





  Bedtime Correctional Scale   





     





     





     


 


Polyethylene Glycol  34 gm  08/06/19 09:00  08/07/19 09:44





  Miralax  PO   34 gm





  DAILY TACO   Administration





     





     





     





     


 


Senna/Docusate Sodium  1 tab  08/05/19 09:00  08/07/19 09:45





  Senokot S  PO   1 tab





  BID TACO   Administration





     





     





     





     


 


Verapamil HCl  180 mg  08/05/19 21:00  08/06/19 20:51





  Calan Er  PO   180 mg





  HS TACO   Administration





     





     





     





     


 


Zolpidem Tartrate  5 mg  08/04/19 14:23  08/05/19 00:54





  Ambien  PO   5 mg





  HSPRN PRN   Administration





  Insomnia   





     





     





     














- Exam


awake alert


Eye: anicteric sclera


ENT: normocephalic atraumatic, moist mucosa


Neck: supple


Heart: RRR


Respiratory - other findings: Fair air entry bilaterally with few transmitted 

sound. No ovbious crackles


Gastrointestinal: soft, non-tender, non-distended, normal bowel sounds


Extremeties - other findings: Left lower extremity edema noted


Skin - other findings: multiple bullous eruptions of different sizes all over 

the body 


Neurological: CN's grossly intact, no focal deficits





Hosp A/P


(1) Deep vein thrombosis (DVT) of left lower extremity


Code(s): I82.402 - ACUTE EMBOLISM AND THOMBOS UNSP DEEP VEINS OF L LOW EXTREM   

Status: Acute   


Qualifiers: 


   Chronicity: acute 





(2) Constipation


Code(s): K59.00 - CONSTIPATION, UNSPECIFIED   Status: Acute   





(3) Bullous pemphigoid


Code(s): L12.0 - BULLOUS PEMPHIGOID   Status: Acute   





(4) Congestive heart failure with left ventricular diastolic dysfunction


Code(s): I50.30 - UNSPECIFIED DIASTOLIC (CONGESTIVE) HEART FAILURE   Status: 

Chronic   


Qualifiers: 


   Congestive heart failure chronicity: chronic   Qualified Code(s): I50.32 - 

Chronic diastolic (congestive) heart failure   





(5) HLD (hyperlipidemia)


Code(s): E78.5 - HYPERLIPIDEMIA, UNSPECIFIED   Status: Chronic   





(6) HTN (hypertension)


Code(s): I10 - ESSENTIAL (PRIMARY) HYPERTENSION   Status: Chronic   


Qualifiers: 


   Hypertension type: essential hypertension   Qualified Code(s): I10 - 

Essential (primary) hypertension   





(7) Hypothyroidism


Code(s): E03.9 - HYPOTHYROIDISM, UNSPECIFIED   Status: Chronic   





(8) Insulin dependent diabetes mellitus


Code(s): E11.9 - TYPE 2 DIABETES MELLITUS WITHOUT COMPLICATIONS; Z79.4 - LONG 

TERM (CURRENT) USE OF INSULIN   Status: Chronic   





(9) Hyponatremia


Code(s): E87.1 - HYPO-OSMOLALITY AND HYPONATREMIA   Status: Acute   





- Plan


Start Systemic steroid with solumedrol 80 mg x1.


increase lantus to 20 units bid.


Continue sliding scale insulin.


Transition lovenox to eliquis.


Continue other treatments.





EHR recommended outpatient status but this patient on presentation has about 

205 of body covered with bullous lesion which should be treated as burns. 


She also is at increased risk of cellulitis. Moreover, its is postulated that 

the acute DVT triggered the bullous eruptions.

## 2019-08-08 LAB
ANION GAP SERPL CALC-SCNC: 15 MMOL/L (ref 10–20)
BASOPHILS # BLD AUTO: 0 THOU/UL (ref 0–0.2)
BASOPHILS NFR BLD AUTO: 0.1 % (ref 0–1)
BUN SERPL-MCNC: 17 MG/DL (ref 9.8–20.1)
CALCIUM SERPL-MCNC: 8.9 MG/DL (ref 7.8–10.44)
CHLORIDE SERPL-SCNC: 97 MMOL/L (ref 98–107)
CO2 SERPL-SCNC: 23 MMOL/L (ref 23–31)
CREAT CL PREDICTED SERPL C-G-VRATE: 61 ML/MIN (ref 70–130)
EOSINOPHIL # BLD AUTO: 0.1 THOU/UL (ref 0–0.7)
EOSINOPHIL NFR BLD AUTO: 0.9 % (ref 0–10)
GLUCOSE SERPL-MCNC: 378 MG/DL (ref 83–110)
HGB BLD-MCNC: 12.1 G/DL (ref 12–16)
LYMPHOCYTES # BLD: 2.4 THOU/UL (ref 1.2–3.4)
LYMPHOCYTES NFR BLD AUTO: 18.1 % (ref 21–51)
MCH RBC QN AUTO: 32.5 PG (ref 27–31)
MCV RBC AUTO: 96.2 FL (ref 78–98)
MONOCYTES # BLD AUTO: 0.4 THOU/UL (ref 0.11–0.59)
MONOCYTES NFR BLD AUTO: 2.8 % (ref 0–10)
NEUTROPHILS # BLD AUTO: 10.1 THOU/UL (ref 1.4–6.5)
NEUTROPHILS NFR BLD AUTO: 78.1 % (ref 42–75)
PLATELET # BLD AUTO: 513 THOU/UL (ref 130–400)
POTASSIUM SERPL-SCNC: 4.8 MMOL/L (ref 3.5–5.1)
RBC # BLD AUTO: 3.73 MILL/UL (ref 4.2–5.4)
SODIUM SERPL-SCNC: 130 MMOL/L (ref 136–145)
WBC # BLD AUTO: 12.9 THOU/UL (ref 4.8–10.8)

## 2019-08-08 RX ADMIN — INSULIN LISPRO PRN UNIT: 100 INJECTION, SOLUTION INTRAVENOUS; SUBCUTANEOUS at 08:28

## 2019-08-08 RX ADMIN — Medication SCH ML: at 21:24

## 2019-08-08 RX ADMIN — INSULIN GLARGINE SCH MLS: 100 INJECTION, SOLUTION SUBCUTANEOUS at 08:31

## 2019-08-08 RX ADMIN — INSULIN LISPRO SCH UNIT: 100 INJECTION, SOLUTION INTRAVENOUS; SUBCUTANEOUS at 17:03

## 2019-08-08 RX ADMIN — INSULIN LISPRO PRN UNIT: 100 INJECTION, SOLUTION INTRAVENOUS; SUBCUTANEOUS at 17:03

## 2019-08-08 RX ADMIN — DOCUSATE SODIUM 50 MG AND SENNOSIDES 8.6 MG SCH TAB: 8.6; 5 TABLET, FILM COATED ORAL at 08:24

## 2019-08-08 RX ADMIN — INSULIN LISPRO PRN UNIT: 100 INJECTION, SOLUTION INTRAVENOUS; SUBCUTANEOUS at 21:29

## 2019-08-08 RX ADMIN — INSULIN LISPRO PRN UNIT: 100 INJECTION, SOLUTION INTRAVENOUS; SUBCUTANEOUS at 11:12

## 2019-08-08 RX ADMIN — INSULIN LISPRO SCH UNIT: 100 INJECTION, SOLUTION INTRAVENOUS; SUBCUTANEOUS at 11:12

## 2019-08-08 RX ADMIN — ASPIRIN SCH MG: 81 TABLET ORAL at 08:24

## 2019-08-08 RX ADMIN — DOCUSATE SODIUM 50 MG AND SENNOSIDES 8.6 MG SCH TAB: 8.6; 5 TABLET, FILM COATED ORAL at 21:24

## 2019-08-08 RX ADMIN — INSULIN GLARGINE SCH MLS: 100 INJECTION, SOLUTION SUBCUTANEOUS at 21:27

## 2019-08-08 RX ADMIN — HYDROCODONE BITARTRATE AND ACETAMINOPHEN PRN TAB: 7.5; 325 TABLET ORAL at 09:00

## 2019-08-08 RX ADMIN — Medication SCH ML: at 09:02

## 2019-08-08 NOTE — PDOC.HOSPP
- Subjective


Encounter Date: 08/08/19


Encounter Time: 09:10


Subjective: 


87 y/o female with HTN, DM and bullous pemphigoid admitted with left leg pain 

and worsening bullous eruption on the skin. Found to have DVT. Started on high 

dose steroid. Report feeling better. No fever.





- Objective


Vital Signs & Weight: 


 Vital Signs (12 hours)











  Temp Pulse Resp BP BP Pulse Ox


 


 08/08/19 08:23  97.6 F  87  18   130/60  97


 


 08/08/19 04:00  98.1 F  86  16  123/56 L   96


 


 08/07/19 23:24  97.4 F L  86  16    98








 Weight











Admit Weight                   199 lb


 


Weight                         195 lb 1.6 oz














I&O: 


 











 08/07/19 08/08/19 08/09/19





 06:59 06:59 06:59


 


Intake Total 1478 720 


 


Output Total 240  


 


Balance 1238 720 











Result Diagrams: 


 08/08/19 04:32





 08/08/19 04:32


Additional Labs: 


 Accuchecks











  08/08/19 08/07/19 08/07/19





  05:36 20:34 16:34


 


POC Glucose  364 H  495 H  475 H














  08/07/19





  11:12


 


POC Glucose  375 H














ROS





- Medication


Medications: 


Active Medications











Generic Name Dose Route Start Last Admin





  Trade Name Freq  PRN Reason Stop Dose Admin


 


Acetaminophen  650 mg  08/04/19 14:23  08/06/19 21:08





  Tylenol  PO   650 mg





  Q4H PRN   Administration





  Headache/Fever/Mild Pain (1-3)   





     





     





     


 


Hydrocodone Bitart/Acetaminophen  1 tab  08/04/19 14:23  08/08/19 09:00





  Norco 7.5/325  PO   1 tab





  Q4H PRN   Administration





  Moderate Pain (4-6)   





     





     





     


 


Apixaban  5 mg  08/07/19 09:00  08/08/19 08:24





  Eliquis  PO   5 mg





  BID TACO   Administration





     





     





     





     


 


Aspirin  81 mg  08/05/19 09:00  08/08/19 08:24





  Ecotrin  PO   81 mg





  QAM TACO   Administration





     





     





     





     


 


Atorvastatin Calcium  20 mg  08/04/19 21:00  08/07/19 20:49





  Lipitor  PO   20 mg





  HS TACO   Administration





     





     





     





     


 


Diphenhydramine HCl  25 mg  08/06/19 22:00  08/08/19 05:38





  Benadryl  PO   25 mg





  Q8HR TACO   Administration





     





     





     





     


 


Furosemide  20 mg  08/05/19 09:00  08/08/19 08:25





  Lasix  PO   20 mg





  QAM TACO   Administration





     





     





     





     


 


Levothyroxine Sodium  50 mcg  08/08/19 06:00  08/08/19 05:38





  Synthroid  PO   50 mcg





  0600 TACO   Administration





     





     





     





     


 


Polyethylene Glycol  34 gm  08/06/19 09:00  08/08/19 08:25





  Miralax  PO   34 gm





  DAILY TACO   Administration





     





     





     





     


 


Prednisone  40 mg  08/08/19 09:00  08/08/19 09:00





  Prednisone  PO   40 mg





  DAILY TACO   Administration





     





     





     





     


 


Senna/Docusate Sodium  1 tab  08/05/19 09:00  08/08/19 08:24





  Senokot S  PO   1 tab





  BID TACO   Administration





     





     





     





     


 


Sodium Chloride  10 ml  08/08/19 09:00  08/08/19 09:02





  Flush - Normal Saline  IVF   10 ml





  Q12HR TACO   Administration





     





     





     





     


 


Tramadol HCl  50 mg  08/04/19 18:21  08/07/19 20:49





  Ultram  PO   50 mg





  Q12H PRN   Administration





  Moderate Pain (4-6)   





     





     





     


 


Verapamil HCl  180 mg  08/05/19 21:00  08/07/19 20:49





  Calan Er  PO   180 mg





  HS TACO   Administration





     





     





     





     


 


Zolpidem Tartrate  5 mg  08/04/19 14:23  08/07/19 20:50





  Ambien  PO   5 mg





  HSPRN PRN   Administration





  Insomnia   





     





     





     














- Exam


awake alert


General - other findings: obese, afebrile


Eye: anicteric sclera


ENT: normocephalic atraumatic, moist mucosa


Neck: supple


Heart: RRR


Respiratory: no wheezes, no rales, no ronchi


Gastrointestinal: soft, non-tender, non-distended, normal bowel sounds


Gastrointestinal - other findings: obese


Extremities: 2+ LE edema


Extremeties - other findings: left leg/foot


Skin - other findings: Multiple blisters as well as erythematous plaques noted


Neurological: CN's grossly intact, no focal deficits


Psychiatric: normal affect, normal behavior, A&O x 3





Hosp A/P


(1) Deep vein thrombosis (DVT) of left lower extremity


Code(s): I82.402 - ACUTE EMBOLISM AND THOMBOS UNSP DEEP VEINS OF L LOW EXTREM   

Status: Acute   


Qualifiers: 


   Chronicity: acute 





(2) Constipation


Code(s): K59.00 - CONSTIPATION, UNSPECIFIED   Status: Acute   





(3) Bullous pemphigoid


Code(s): L12.0 - BULLOUS PEMPHIGOID   Status: Acute   





(4) Congestive heart failure with left ventricular diastolic dysfunction


Code(s): I50.30 - UNSPECIFIED DIASTOLIC (CONGESTIVE) HEART FAILURE   Status: 

Chronic   


Qualifiers: 


   Congestive heart failure chronicity: chronic   Qualified Code(s): I50.32 - 

Chronic diastolic (congestive) heart failure   





(5) HLD (hyperlipidemia)


Code(s): E78.5 - HYPERLIPIDEMIA, UNSPECIFIED   Status: Chronic   





(6) HTN (hypertension)


Code(s): I10 - ESSENTIAL (PRIMARY) HYPERTENSION   Status: Chronic   


Qualifiers: 


   Hypertension type: essential hypertension   Qualified Code(s): I10 - 

Essential (primary) hypertension   





(7) Hypothyroidism


Code(s): E03.9 - HYPOTHYROIDISM, UNSPECIFIED   Status: Chronic   





(8) Insulin dependent diabetes mellitus


Code(s): E11.9 - TYPE 2 DIABETES MELLITUS WITHOUT COMPLICATIONS; Z79.4 - LONG 

TERM (CURRENT) USE OF INSULIN   Status: Chronic   





(9) Hyponatremia


Code(s): E87.1 - HYPO-OSMOLALITY AND HYPONATREMIA   Status: Acute   





(10) Hyperglycemia, drug-induced


Code(s): R73.9 - HYPERGLYCEMIA, UNSPECIFIED; T50.905A - ADVERSE EFFECT OF UNSP 

DRUG/MEDS/BIOL SUBST, INIT   Status: Acute   





- Plan


Start oral prednisone 40 mg daily


increase lantus to 30 units bid.


Start humalog 4 units with meals and change to medium dose sliding scale 

insulin.


Continue eliquis


Continue other treatments.


wound care to continue.

## 2019-08-09 RX ADMIN — INSULIN LISPRO SCH: 100 INJECTION, SOLUTION INTRAVENOUS; SUBCUTANEOUS at 09:49

## 2019-08-09 RX ADMIN — HYDROCODONE BITARTRATE AND ACETAMINOPHEN PRN TAB: 7.5; 325 TABLET ORAL at 05:04

## 2019-08-09 RX ADMIN — INSULIN GLARGINE SCH MLS: 100 INJECTION, SOLUTION SUBCUTANEOUS at 09:50

## 2019-08-09 RX ADMIN — ASPIRIN SCH MG: 81 TABLET ORAL at 09:51

## 2019-08-09 RX ADMIN — DOCUSATE SODIUM 50 MG AND SENNOSIDES 8.6 MG SCH TAB: 8.6; 5 TABLET, FILM COATED ORAL at 09:51

## 2019-08-09 RX ADMIN — HYDROCODONE BITARTRATE AND ACETAMINOPHEN PRN TAB: 7.5; 325 TABLET ORAL at 10:02

## 2019-08-09 RX ADMIN — INSULIN LISPRO SCH UNIT: 100 INJECTION, SOLUTION INTRAVENOUS; SUBCUTANEOUS at 17:43

## 2019-08-09 RX ADMIN — HYDROCODONE BITARTRATE AND ACETAMINOPHEN PRN TAB: 7.5; 325 TABLET ORAL at 20:21

## 2019-08-09 RX ADMIN — Medication SCH ML: at 09:51

## 2019-08-09 RX ADMIN — INSULIN GLARGINE SCH MLS: 100 INJECTION, SOLUTION SUBCUTANEOUS at 21:35

## 2019-08-09 RX ADMIN — DOCUSATE SODIUM 50 MG AND SENNOSIDES 8.6 MG SCH TAB: 8.6; 5 TABLET, FILM COATED ORAL at 20:23

## 2019-08-09 RX ADMIN — Medication SCH: at 21:02

## 2019-08-09 RX ADMIN — INSULIN LISPRO SCH UNIT: 100 INJECTION, SOLUTION INTRAVENOUS; SUBCUTANEOUS at 12:34

## 2019-08-09 NOTE — PDOC.HOSPP
- Subjective


Encounter Date: 08/09/19


Encounter Time: 08:53


Subjective: 


85 y/o female with HTN, DM and bullous pemphigoid admitted with left leg pain 

and worsening bullous eruption on the skin. Found to have DVT. On treatment 

with steroid and oral anticoagulant. No fever or skin redness. Constipation has 

resolved. Report feeling better.





- Objective


Vital Signs & Weight: 


 Vital Signs (12 hours)











  Temp Pulse Resp BP Pulse Ox


 


 08/09/19 08:00  98.3 F  78  18  133/60  95


 


 08/09/19 05:27      98








 Weight











Admit Weight                   199 lb


 


Weight                         199 lb 4.766 oz














I&O: 


 











 08/08/19 08/09/19 08/10/19





 06:59 06:59 06:59


 


Intake Total 720 1820 240


 


Output Total  400 


 


Balance 720 1420 240











Result Diagrams: 


 08/08/19 04:32





 08/08/19 04:32


Additional Labs: 


 Accuchecks











  08/09/19 08/09/19 08/08/19





  11:19 05:24 20:35


 


POC Glucose  201 H  125 H  275 H














  08/08/19





  16:44


 


POC Glucose  284 H














ROS





- Medication


Medications: 


Active Medications











Generic Name Dose Route Start Last Admin





  Trade Name Freq  PRN Reason Stop Dose Admin


 


Acetaminophen  650 mg  08/04/19 14:23  08/06/19 21:08





  Tylenol  PO   650 mg





  Q4H PRN   Administration





  Headache/Fever/Mild Pain (1-3)   





     





     





     


 


Hydrocodone Bitart/Acetaminophen  1 tab  08/04/19 14:23  08/09/19 10:02





  Norco 7.5/325  PO   1 tab





  Q4H PRN   Administration





  Moderate Pain (4-6)   





     





     





     


 


Apixaban  5 mg  08/07/19 09:00  08/09/19 09:51





  Eliquis  PO   5 mg





  BID TACO   Administration





     





     





     





     


 


Aspirin  81 mg  08/05/19 09:00  08/09/19 09:51





  Ecotrin  PO   81 mg





  QAM TACO   Administration





     





     





     





     


 


Atorvastatin Calcium  20 mg  08/04/19 21:00  08/08/19 21:23





  Lipitor  PO   20 mg





  HS TACO   Administration





     





     





     





     


 


Diphenhydramine HCl  25 mg  08/06/19 22:00  08/09/19 05:04





  Benadryl  PO   25 mg





  Q8HR TACO   Administration





     





     





     





     


 


Furosemide  20 mg  08/05/19 09:00  08/09/19 09:51





  Lasix  PO   20 mg





  QAM TACO   Administration





     





     





     





     


 


Insulin Glargine 30 units/  0.3 mls @ 0.2 mls/hr  08/08/19 21:00  08/09/19 09:50





  Miscellaneous Medication  SC   0.3 mls





  BID TACO   Administration





     





     





     





     


 


Insulin Human Lispro  4 units  08/09/19 08:00  08/09/19 09:49





  Humalog  SC   Not Given





  0800 TACO   





     





     





     





     


 


Insulin Human Lispro  4 units  08/08/19 12:00  08/08/19 11:12





  Humalog  SC   4 unit





  1200 TACO   Administration





     





     





     





     


 


Insulin Human Lispro  4 units  08/08/19 17:00  08/08/19 17:03





  Humalog  SC   4 unit





  1700 TACO   Administration





     





     





     





     


 


Insulin Human Lispro  0 units  08/08/19 08:37  08/08/19 21:29





  Humalog  SC   6 unit





  .MODERATE SLIDING SC PRN   Administration





  Moderate Correctional Scale   





     





     





     


 


Levothyroxine Sodium  50 mcg  08/08/19 06:00  08/09/19 05:03





  Synthroid  PO   50 mcg





  0600 TACO   Administration





     





     





     





     


 


Polyethylene Glycol  34 gm  08/06/19 09:00  08/09/19 09:50





  Miralax  PO   34 gm





  DAILY TACO   Administration





     





     





     





     


 


Prednisone  40 mg  08/08/19 09:00  08/09/19 09:50





  Prednisone  PO   40 mg





  DAILY TACO   Administration





     





     





     





     


 


Senna/Docusate Sodium  1 tab  08/05/19 09:00  08/09/19 09:51





  Senokot S  PO   1 tab





  BID TACO   Administration





     





     





     





     


 


Sodium Chloride  10 ml  08/08/19 09:00  08/09/19 09:51





  Flush - Normal Saline  IVF   10 ml





  Q12HR TACO   Administration





     





     





     





     


 


Tramadol HCl  50 mg  08/04/19 18:21  08/07/19 20:49





  Ultram  PO   50 mg





  Q12H PRN   Administration





  Moderate Pain (4-6)   





     





     





     


 


Verapamil HCl  180 mg  08/05/19 21:00  08/08/19 21:24





  Calan Er  PO   180 mg





  HS TACO   Administration





     





     





     





     


 


Zolpidem Tartrate  5 mg  08/04/19 14:23  08/08/19 21:26





  Ambien  PO   5 mg





  HSPRN PRN   Administration





  Insomnia   





     





     





     














- Exam


awake alert


Eye: PERRL


ENT: normocephalic atraumatic


Heart: RRR


Respiratory: no wheezes, no rales, no ronchi


Gastrointestinal: soft, non-tender, non-distended, normal bowel sounds


Gastrointestinal - other findings: obese


Extremities: no cyanosis


Extremeties - other findings: Left leg edema noted


Skin - other findings: Scattered bullous erutions and erythematous plaques 

noted on the body/limbs


Neurological: CN's grossly intact, no focal deficits


Psychiatric: A&O x 3





Hosp A/P


(1) Deep vein thrombosis (DVT) of left lower extremity


Code(s): I82.402 - ACUTE EMBOLISM AND THOMBOS UNSP DEEP VEINS OF L LOW EXTREM   

Status: Acute   


Qualifiers: 


   Chronicity: acute 





(2) Bullous pemphigoid


Code(s): L12.0 - BULLOUS PEMPHIGOID   Status: Acute   





(3) Congestive heart failure with left ventricular diastolic dysfunction


Code(s): I50.30 - UNSPECIFIED DIASTOLIC (CONGESTIVE) HEART FAILURE   Status: 

Chronic   


Qualifiers: 


   Congestive heart failure chronicity: chronic   Qualified Code(s): I50.32 - 

Chronic diastolic (congestive) heart failure   





(4) HLD (hyperlipidemia)


Code(s): E78.5 - HYPERLIPIDEMIA, UNSPECIFIED   Status: Chronic   





(5) HTN (hypertension)


Code(s): I10 - ESSENTIAL (PRIMARY) HYPERTENSION   Status: Chronic   


Qualifiers: 


   Hypertension type: essential hypertension   Qualified Code(s): I10 - 

Essential (primary) hypertension   





(6) Hypothyroidism


Code(s): E03.9 - HYPOTHYROIDISM, UNSPECIFIED   Status: Chronic   





(7) Insulin dependent diabetes mellitus


Code(s): E11.9 - TYPE 2 DIABETES MELLITUS WITHOUT COMPLICATIONS; Z79.4 - LONG 

TERM (CURRENT) USE OF INSULIN   Status: Chronic   





(8) Hyponatremia


Code(s): E87.1 - HYPO-OSMOLALITY AND HYPONATREMIA   Status: Acute   





(9) Hyperglycemia, drug-induced


Code(s): R73.9 - HYPERGLYCEMIA, UNSPECIFIED; T50.905A - ADVERSE EFFECT OF UNSP 

DRUG/MEDS/BIOL SUBST, INIT   Status: Acute   





(10) Physical deconditioning


Code(s): R53.81 - OTHER MALAISE   Status: Acute   





(11) Constipation


Code(s): K59.00 - CONSTIPATION, UNSPECIFIED   Status: Acute   





- Plan


Continue oral prednisone 40 mg daily and insulin therapy


Continue eliquis


Continue other treatments.


wound care to continue.


PT/OT eval and treat.


For discharge once placement is concluded

## 2019-08-10 LAB
ANION GAP SERPL CALC-SCNC: 15 MMOL/L (ref 10–20)
BASOPHILS # BLD AUTO: 0.1 THOU/UL (ref 0–0.2)
BASOPHILS NFR BLD AUTO: 0.3 % (ref 0–1)
BUN SERPL-MCNC: 16 MG/DL (ref 9.8–20.1)
CALCIUM SERPL-MCNC: 9.2 MG/DL (ref 7.8–10.44)
CHLORIDE SERPL-SCNC: 98 MMOL/L (ref 98–107)
CO2 SERPL-SCNC: 26 MMOL/L (ref 23–31)
CREAT CL PREDICTED SERPL C-G-VRATE: 63 ML/MIN (ref 70–130)
EOSINOPHIL # BLD AUTO: 1.8 THOU/UL (ref 0–0.7)
EOSINOPHIL NFR BLD AUTO: 10.1 % (ref 0–10)
GLUCOSE SERPL-MCNC: 204 MG/DL (ref 83–110)
HGB BLD-MCNC: 13.1 G/DL (ref 12–16)
LYMPHOCYTES # BLD: 3.5 THOU/UL (ref 1.2–3.4)
LYMPHOCYTES NFR BLD AUTO: 19.7 % (ref 21–51)
MCH RBC QN AUTO: 32.8 PG (ref 27–31)
MCV RBC AUTO: 98 FL (ref 78–98)
MONOCYTES # BLD AUTO: 0.9 THOU/UL (ref 0.11–0.59)
MONOCYTES NFR BLD AUTO: 5 % (ref 0–10)
NEUTROPHILS # BLD AUTO: 11.4 THOU/UL (ref 1.4–6.5)
NEUTROPHILS NFR BLD AUTO: 64.9 % (ref 42–75)
PLATELET # BLD AUTO: 552 THOU/UL (ref 130–400)
POTASSIUM SERPL-SCNC: 4.2 MMOL/L (ref 3.5–5.1)
RBC # BLD AUTO: 4.01 MILL/UL (ref 4.2–5.4)
SODIUM SERPL-SCNC: 135 MMOL/L (ref 136–145)
WBC # BLD AUTO: 17.5 THOU/UL (ref 4.8–10.8)

## 2019-08-10 RX ADMIN — DOCUSATE SODIUM 50 MG AND SENNOSIDES 8.6 MG SCH TAB: 8.6; 5 TABLET, FILM COATED ORAL at 21:57

## 2019-08-10 RX ADMIN — INSULIN LISPRO SCH UNIT: 100 INJECTION, SOLUTION INTRAVENOUS; SUBCUTANEOUS at 12:15

## 2019-08-10 RX ADMIN — HYDROCODONE BITARTRATE AND ACETAMINOPHEN PRN TAB: 7.5; 325 TABLET ORAL at 10:43

## 2019-08-10 RX ADMIN — INSULIN LISPRO SCH: 100 INJECTION, SOLUTION INTRAVENOUS; SUBCUTANEOUS at 10:19

## 2019-08-10 RX ADMIN — ASPIRIN SCH MG: 81 TABLET ORAL at 09:16

## 2019-08-10 RX ADMIN — HYDROCODONE BITARTRATE AND ACETAMINOPHEN PRN TAB: 7.5; 325 TABLET ORAL at 07:07

## 2019-08-10 RX ADMIN — DOCUSATE SODIUM 50 MG AND SENNOSIDES 8.6 MG SCH TAB: 8.6; 5 TABLET, FILM COATED ORAL at 09:16

## 2019-08-10 RX ADMIN — INSULIN GLARGINE SCH MLS: 100 INJECTION, SOLUTION SUBCUTANEOUS at 21:59

## 2019-08-10 RX ADMIN — Medication SCH ML: at 21:59

## 2019-08-10 RX ADMIN — INSULIN GLARGINE SCH MLS: 100 INJECTION, SOLUTION SUBCUTANEOUS at 09:16

## 2019-08-10 RX ADMIN — Medication SCH ML: at 09:17

## 2019-08-10 RX ADMIN — INSULIN LISPRO SCH UNIT: 100 INJECTION, SOLUTION INTRAVENOUS; SUBCUTANEOUS at 17:26

## 2019-08-10 NOTE — PDOC.HOSPP
- Subjective


Encounter Date: 08/10/19


Encounter Time: 15:33


Subjective: 


85 y/o female with HTN, DM and bullous pemphigoid admitted with left leg pain 

and worsening bullous eruption on the skin. Found to have DVT and was started 

on anticoagulant. Also on steroid for bullous eruptions after initial 

hesitation due to hyperglycemic effect of steroid. No fever or skin redness. 

Flipped into atrial fib and back to SR earlier today. Denied chest pain or 

fever.





- Objective


Vital Signs & Weight: 


 Vital Signs (12 hours)











  Temp Pulse Resp BP Pulse Ox


 


 08/10/19 12:12  98.5 F  72  20  159/71 H  95


 


 08/10/19 08:00  98 F  80  18  136/61  96








 Weight











Admit Weight                   199 lb


 


Weight                         197 lb 6.4 oz














I&O: 


 











 08/09/19 08/10/19 08/11/19





 06:59 06:59 06:59


 


Intake Total 1820 3110 480


 


Output Total 400  


 


Balance 1420 3110 480











Result Diagrams: 


 08/10/19 10:47





 08/10/19 10:47


Additional Labs: 


 Accuchecks











  08/10/19 08/10/19 08/09/19





  11:11 05:18 20:29


 


POC Glucose  198 H  113 H  360 H














  08/09/19





  16:46


 


POC Glucose  329 H














ROS





- Medication


Medications: 


Active Medications











Generic Name Dose Route Start Last Admin





  Trade Name Freq  PRN Reason Stop Dose Admin


 


Acetaminophen  650 mg  08/04/19 14:23  08/06/19 21:08





  Tylenol  PO   650 mg





  Q4H PRN   Administration





  Headache/Fever/Mild Pain (1-3)   





     





     





     


 


Hydrocodone Bitart/Acetaminophen  1 tab  08/04/19 14:23  08/10/19 10:43





  Norco 7.5/325  PO   1 tab





  Q4H PRN   Administration





  Moderate Pain (4-6)   





     





     





     


 


Apixaban  5 mg  08/07/19 09:00  08/10/19 09:16





  Eliquis  PO   5 mg





  BID TACO   Administration





     





     





     





     


 


Aspirin  81 mg  08/05/19 09:00  08/10/19 09:16





  Ecotrin  PO   81 mg





  QAM TACO   Administration





     





     





     





     


 


Atorvastatin Calcium  20 mg  08/04/19 21:00  08/09/19 20:23





  Lipitor  PO   20 mg





  HS TACO   Administration





     





     





     





     


 


Diphenhydramine HCl  25 mg  08/06/19 22:00  08/10/19 13:55





  Benadryl  PO   25 mg





  Q8HR TACO   Administration





     





     





     





     


 


Docusate Sodium  100 mg  08/07/19 23:08  08/10/19 14:02





  Colace  PO   100 mg





  BID PRN   Administration





  Constipation   





     





     





     


 


Furosemide  20 mg  08/05/19 09:00  08/10/19 09:17





  Lasix  PO   20 mg





  QAM TACO   Administration





     





     





     





     


 


Insulin Glargine 30 units/  0.3 mls @ 0.2 mls/hr  08/08/19 21:00  08/10/19 09:16





  Miscellaneous Medication  SC   0.3 mls





  BID TACO   Administration





     





     





     





     


 


Insulin Human Lispro  0 units  08/08/19 08:37  08/08/19 21:29





  Humalog  SC   6 unit





  .MODERATE SLIDING SC PRN   Administration





  Moderate Correctional Scale   





     





     





     


 


Insulin Human Lispro  0 units  08/09/19 21:07  08/09/19 21:37





  Humalog  SC   5 unit





  .BEDTIME SLIDING SC PRN   Administration





  Bedtime Correctional Scale   





     





     





     


 


Insulin Human Lispro  6 units  08/10/19 08:16  08/10/19 12:15





  Humalog  SC   6 unit





  1200 TACO   Administration





     





     





     





     


 


Levothyroxine Sodium  50 mcg  08/08/19 06:00  08/10/19 05:33





  Synthroid  PO   50 mcg





  0600 TACO   Administration





     





     





     





     


 


Polyethylene Glycol  34 gm  08/06/19 09:00  08/10/19 09:16





  Miralax  PO   34 gm





  DAILY TACO   Administration





     





     





     





     


 


Prednisone  40 mg  08/08/19 09:00  08/10/19 09:16





  Prednisone  PO   40 mg





  DAILY TACO   Administration





     





     





     





     


 


Senna/Docusate Sodium  1 tab  08/05/19 09:00  08/10/19 09:16





  Senokot S  PO   1 tab





  BID TACO   Administration





     





     





     





     


 


Sodium Chloride  10 ml  08/08/19 09:00  08/10/19 09:17





  Flush - Normal Saline  IVF   10 ml





  Q12HR TACO   Administration





     





     





     





     


 


Tramadol HCl  50 mg  08/04/19 18:21  08/10/19 14:02





  Ultram  PO   50 mg





  Q12H PRN   Administration





  Moderate Pain (4-6)   





     





     





     


 


Verapamil HCl  180 mg  08/05/19 21:00  08/09/19 20:32





  Calan Er  PO   180 mg





  HS TACO   Administration





     





     





     





     


 


Zolpidem Tartrate  5 mg  08/04/19 14:23  08/08/19 21:26





  Ambien  PO   5 mg





  HSPRN PRN   Administration





  Insomnia   





     





     





     














- Exam


awake alert


Eye: PERRL, anicteric sclera


ENT: normocephalic atraumatic


Neck: supple


Heart: RRR


Respiratory: no wheezes, no rales, no ronchi


Gastrointestinal: soft, non-tender, non-distended, normal bowel sounds


Gastrointestinal - other findings: obese


Extremities: 1+ LE edema


Extremeties - other findings: Left leg edema noted


Skin - other findings: scatered bullous eruptions and regressing erythemaouts 

plaques noted


Neurological: CN's grossly intact, no focal deficits


Psychiatric: A&O x 3





Hosp A/P


(1) Deep vein thrombosis (DVT) of left lower extremity


Code(s): I82.402 - ACUTE EMBOLISM AND THOMBOS UNSP DEEP VEINS OF L LOW EXTREM   

Status: Acute   


Qualifiers: 


   Chronicity: acute 





(2) Bullous pemphigoid


Code(s): L12.0 - BULLOUS PEMPHIGOID   Status: Acute   





(3) Congestive heart failure with left ventricular diastolic dysfunction


Code(s): I50.30 - UNSPECIFIED DIASTOLIC (CONGESTIVE) HEART FAILURE   Status: 

Chronic   


Qualifiers: 


   Congestive heart failure chronicity: chronic   Qualified Code(s): I50.32 - 

Chronic diastolic (congestive) heart failure   





(4) HLD (hyperlipidemia)


Code(s): E78.5 - HYPERLIPIDEMIA, UNSPECIFIED   Status: Chronic   





(5) HTN (hypertension)


Code(s): I10 - ESSENTIAL (PRIMARY) HYPERTENSION   Status: Chronic   


Qualifiers: 


   Hypertension type: essential hypertension   Qualified Code(s): I10 - 

Essential (primary) hypertension   





(6) Hypothyroidism


Code(s): E03.9 - HYPOTHYROIDISM, UNSPECIFIED   Status: Chronic   





(7) Insulin dependent diabetes mellitus


Code(s): E11.9 - TYPE 2 DIABETES MELLITUS WITHOUT COMPLICATIONS; Z79.4 - LONG 

TERM (CURRENT) USE OF INSULIN   Status: Chronic   





(8) Hyponatremia


Code(s): E87.1 - HYPO-OSMOLALITY AND HYPONATREMIA   Status: Acute   





(9) Hyperglycemia, drug-induced


Code(s): R73.9 - HYPERGLYCEMIA, UNSPECIFIED; T50.905A - ADVERSE EFFECT OF UNSP 

DRUG/MEDS/BIOL SUBST, INIT   Status: Acute   





(10) Physical deconditioning


Code(s): R53.81 - OTHER MALAISE   Status: Acute   





(11) Constipation


Code(s): K59.00 - CONSTIPATION, UNSPECIFIED   Status: Acute   





(12) Paroxysmal atrial fibrillation with RVR


Code(s): I48.0 - PAROXYSMAL ATRIAL FIBRILLATION   Status: Acute   





(13) Leukocytosis


Code(s): D72.829 - ELEVATED WHITE BLOOD CELL COUNT, UNSPECIFIED   Status: 

Resolved   





- Plan


Continue oral prednisone 40 mg daily and insulin therapy


Continue eliquis


PT/OT eval and treat.


get magnesium in the morning.


Wound care to continue


For discharge once placement is concluded likely in 2 days

## 2019-08-11 LAB
ANION GAP SERPL CALC-SCNC: 14 MMOL/L (ref 10–20)
BASOPHILS # BLD AUTO: 0 THOU/UL (ref 0–0.2)
BASOPHILS NFR BLD AUTO: 0.3 % (ref 0–1)
BUN SERPL-MCNC: 13 MG/DL (ref 9.8–20.1)
CALCIUM SERPL-MCNC: 9 MG/DL (ref 7.8–10.44)
CHLORIDE SERPL-SCNC: 99 MMOL/L (ref 98–107)
CO2 SERPL-SCNC: 25 MMOL/L (ref 23–31)
CREAT CL PREDICTED SERPL C-G-VRATE: 75 ML/MIN (ref 70–130)
EOSINOPHIL # BLD AUTO: 1.7 THOU/UL (ref 0–0.7)
EOSINOPHIL NFR BLD AUTO: 10.7 % (ref 0–10)
GLUCOSE SERPL-MCNC: 100 MG/DL (ref 83–110)
HGB BLD-MCNC: 12.8 G/DL (ref 12–16)
LYMPHOCYTES # BLD: 4.5 THOU/UL (ref 1.2–3.4)
LYMPHOCYTES NFR BLD AUTO: 28.2 % (ref 21–51)
MAGNESIUM SERPL-MCNC: 2.2 MG/DL (ref 1.6–2.6)
MCH RBC QN AUTO: 32.5 PG (ref 27–31)
MCV RBC AUTO: 98.1 FL (ref 78–98)
MONOCYTES # BLD AUTO: 0.9 THOU/UL (ref 0.11–0.59)
MONOCYTES NFR BLD AUTO: 5.6 % (ref 0–10)
NEUTROPHILS # BLD AUTO: 8.8 THOU/UL (ref 1.4–6.5)
NEUTROPHILS NFR BLD AUTO: 55.2 % (ref 42–75)
PLATELET # BLD AUTO: 530 THOU/UL (ref 130–400)
POTASSIUM SERPL-SCNC: 3.8 MMOL/L (ref 3.5–5.1)
RBC # BLD AUTO: 3.95 MILL/UL (ref 4.2–5.4)
SODIUM SERPL-SCNC: 134 MMOL/L (ref 136–145)
WBC # BLD AUTO: 15.9 THOU/UL (ref 4.8–10.8)

## 2019-08-11 RX ADMIN — DOCUSATE SODIUM 50 MG AND SENNOSIDES 8.6 MG SCH TAB: 8.6; 5 TABLET, FILM COATED ORAL at 20:53

## 2019-08-11 RX ADMIN — INSULIN LISPRO SCH UNIT: 100 INJECTION, SOLUTION INTRAVENOUS; SUBCUTANEOUS at 18:03

## 2019-08-11 RX ADMIN — ACETAMINOPHEN AND CODEINE PHOSPHATE PRN TAB: 300; 30 TABLET ORAL at 14:11

## 2019-08-11 RX ADMIN — ASPIRIN SCH MG: 81 TABLET ORAL at 09:20

## 2019-08-11 RX ADMIN — Medication SCH ML: at 20:54

## 2019-08-11 RX ADMIN — INSULIN GLARGINE SCH MLS: 100 INJECTION, SOLUTION SUBCUTANEOUS at 09:19

## 2019-08-11 RX ADMIN — INSULIN GLARGINE SCH MLS: 100 INJECTION, SOLUTION SUBCUTANEOUS at 20:53

## 2019-08-11 RX ADMIN — HYDROCODONE BITARTRATE AND ACETAMINOPHEN PRN TAB: 7.5; 325 TABLET ORAL at 09:50

## 2019-08-11 RX ADMIN — Medication SCH ML: at 09:21

## 2019-08-11 RX ADMIN — HYDROCODONE BITARTRATE AND ACETAMINOPHEN PRN TAB: 7.5; 325 TABLET ORAL at 00:51

## 2019-08-11 RX ADMIN — INSULIN LISPRO SCH UNIT: 100 INJECTION, SOLUTION INTRAVENOUS; SUBCUTANEOUS at 14:11

## 2019-08-11 RX ADMIN — DOCUSATE SODIUM 50 MG AND SENNOSIDES 8.6 MG SCH TAB: 8.6; 5 TABLET, FILM COATED ORAL at 09:20

## 2019-08-11 RX ADMIN — HYDROCODONE BITARTRATE AND ACETAMINOPHEN PRN TAB: 7.5; 325 TABLET ORAL at 20:58

## 2019-08-11 RX ADMIN — INSULIN LISPRO SCH: 100 INJECTION, SOLUTION INTRAVENOUS; SUBCUTANEOUS at 09:19

## 2019-08-11 NOTE — PDOC.HOSPP
- Subjective


Encounter Date: 08/11/19 (n)


Encounter Time: 09:01


Subjective: 


87 y/o female with HTN, DM and bullous pemphigoid admitted with left leg pain 

and worsening bullous eruption on the skin. Found to have DVT and was started 

on anticoagulant. Also now on steroid for bullous eruptions after initial 

hesitation due to hyperglycemic effect of steroid. Flipped into atrial fib and 

back to SR on 8/10/2019. Denied chest pain or fever. still have some pruritus





- Objective


Vital Signs & Weight: 


 Vital Signs (12 hours)











  Temp Pulse Resp BP BP Pulse Ox


 


 08/11/19 12:00  97.8 F  85  16   120/59 L  96


 


 08/11/19 09:15  98.2 F  98  18   139/62  96


 


 08/11/19 03:32  97.8 F  89  18  158/67 H   95








 Weight











Admit Weight                   199 lb


 


Weight                         198 lb 3.2 oz














I&O: 


 











 08/10/19 08/11/19 08/12/19





 06:59 06:59 06:59


 


Intake Total 3110 1900 240


 


Balance 3110 1900 240











Result Diagrams: 


 08/11/19 06:26





 08/11/19 06:26


Additional Labs: 


 Accuchecks











  08/11/19 08/11/19 08/10/19





  11:42 05:40 20:22


 


POC Glucose  162 H  84  281 H














  08/10/19





  16:54


 


POC Glucose  243 H














ROS





- Medication


Medications: 


Active Medications











Generic Name Dose Route Start Last Admin





  Trade Name Freq  PRN Reason Stop Dose Admin


 


Acetaminophen  650 mg  08/04/19 14:23  08/06/19 21:08





  Tylenol  PO   650 mg





  Q4H PRN   Administration





  Headache/Fever/Mild Pain (1-3)   





     





     





     


 


Hydrocodone Bitart/Acetaminophen  1 tab  08/04/19 14:23  08/11/19 09:50





  Norco 7.5/325  PO   1 tab





  Q4H PRN   Administration





  Moderate Pain (4-6)   





     





     





     


 


Apixaban  5 mg  08/07/19 09:00  08/11/19 09:20





  Eliquis  PO   5 mg





  BID TACO   Administration





     





     





     





     


 


Aspirin  81 mg  08/05/19 09:00  08/11/19 09:20





  Ecotrin  PO   81 mg





  QAM TACO   Administration





     





     





     





     


 


Atorvastatin Calcium  20 mg  08/04/19 21:00  08/10/19 21:57





  Lipitor  PO   20 mg





  HS TACO   Administration





     





     





     





     


 


Diphenhydramine HCl  25 mg  08/06/19 22:00  08/11/19 05:17





  Benadryl  PO   25 mg





  Q8HR TACO   Administration





     





     





     





     


 


Docusate Sodium  100 mg  08/07/19 23:08  08/10/19 14:02





  Colace  PO   100 mg





  BID PRN   Administration





  Constipation   





     





     





     


 


Furosemide  20 mg  08/05/19 09:00  08/11/19 09:20





  Lasix  PO   20 mg





  QAM TACO   Administration





     





     





     





     


 


Insulin Glargine 30 units/  0.3 mls @ 0.2 mls/hr  08/08/19 21:00  08/11/19 09:19





  Miscellaneous Medication  SC   0.3 mls





  BID TACO   Administration





     





     





     





     


 


Insulin Human Lispro  0 units  08/08/19 08:37  08/08/19 21:29





  Humalog  SC   6 unit





  .MODERATE SLIDING SC PRN   Administration





  Moderate Correctional Scale   





     





     





     


 


Insulin Human Lispro  0 units  08/09/19 21:07  08/09/19 21:37





  Humalog  SC   5 unit





  .BEDTIME SLIDING SC PRN   Administration





  Bedtime Correctional Scale   





     





     





     


 


Insulin Human Lispro  6 units  08/10/19 08:16  08/10/19 17:26





  Humalog  SC   6 unit





  1700 TACO   Administration





     





     





     





     


 


Insulin Human Lispro  6 units  08/10/19 08:16  08/10/19 12:15





  Humalog  SC   6 unit





  1200 TACO   Administration





     





     





     





     


 


Insulin Human Lispro  6 units  08/10/19 08:16  08/11/19 09:19





  Humalog  SC   Not Given





  0800 TACO   





     





     





     





     


 


Levothyroxine Sodium  50 mcg  08/08/19 06:00  08/11/19 05:17





  Synthroid  PO   50 mcg





  0600 TACO   Administration





     





     





     





     


 


Polyethylene Glycol  34 gm  08/06/19 09:00  08/11/19 09:20





  Miralax  PO   34 gm





  DAILY TACO   Administration





     





     





     





     


 


Prednisone  40 mg  08/08/19 09:00  08/11/19 09:20





  Prednisone  PO   40 mg





  DAILY TACO   Administration





     





     





     





     


 


Senna/Docusate Sodium  1 tab  08/05/19 09:00  08/11/19 09:20





  Senokot S  PO   1 tab





  BID TACO   Administration





     





     





     





     


 


Sodium Chloride  10 ml  08/08/19 09:00  08/11/19 09:21





  Flush - Normal Saline  IVF   10 ml





  Q12HR TACO   Administration





     





     





     





     


 


Tramadol HCl  50 mg  08/04/19 18:21  08/11/19 05:20





  Ultram  PO   50 mg





  Q12H PRN   Administration





  Moderate Pain (4-6)   





     





     





     


 


Verapamil HCl  180 mg  08/05/19 21:00  08/10/19 21:57





  Calan Er  PO   180 mg





  HS TACO   Administration





     





     





     





     


 


Zolpidem Tartrate  5 mg  08/04/19 14:23  08/08/19 21:26





  Ambien  PO   5 mg





  HSPRN PRN   Administration





  Insomnia   





     





     





     














- Exam


awake alert


Eye: anicteric sclera


ENT: normocephalic atraumatic


Neck: supple, symmetric


Heart: RRR


Respiratory: no wheezes, no rales, no ronchi


Gastrointestinal: soft, non-tender, non-distended, normal bowel sounds


Extremities: no cyanosis, 1+ LE edema


Extremeties - other findings: left leg edema


Skin - other findings: scattered bullous eruptions with many deroofed lesion 

and plaques


Neurological: CN's grossly intact, no focal deficits


Psychiatric: normal affect, A&O x 3





Hosp A/P


(1) Deep vein thrombosis (DVT) of left lower extremity


Code(s): I82.402 - ACUTE EMBOLISM AND THOMBOS UNSP DEEP VEINS OF L LOW EXTREM   

Status: Acute   


Qualifiers: 


   Chronicity: acute 





(2) Bullous pemphigoid


Code(s): L12.0 - BULLOUS PEMPHIGOID   Status: Acute   





(3) Congestive heart failure with left ventricular diastolic dysfunction


Code(s): I50.30 - UNSPECIFIED DIASTOLIC (CONGESTIVE) HEART FAILURE   Status: 

Chronic   


Qualifiers: 


   Congestive heart failure chronicity: chronic   Qualified Code(s): I50.32 - 

Chronic diastolic (congestive) heart failure   





(4) HLD (hyperlipidemia)


Code(s): E78.5 - HYPERLIPIDEMIA, UNSPECIFIED   Status: Chronic   





(5) HTN (hypertension)


Code(s): I10 - ESSENTIAL (PRIMARY) HYPERTENSION   Status: Chronic   


Qualifiers: 


   Hypertension type: essential hypertension   Qualified Code(s): I10 - 

Essential (primary) hypertension   





(6) Hypothyroidism


Code(s): E03.9 - HYPOTHYROIDISM, UNSPECIFIED   Status: Chronic   





(7) Insulin dependent diabetes mellitus


Code(s): E11.9 - TYPE 2 DIABETES MELLITUS WITHOUT COMPLICATIONS; Z79.4 - LONG 

TERM (CURRENT) USE OF INSULIN   Status: Chronic   





(8) Hyponatremia


Code(s): E87.1 - HYPO-OSMOLALITY AND HYPONATREMIA   Status: Acute   





(9) Hyperglycemia, drug-induced


Code(s): R73.9 - HYPERGLYCEMIA, UNSPECIFIED; T50.905A - ADVERSE EFFECT OF UNSP 

DRUG/MEDS/BIOL SUBST, INIT   Status: Acute   





(10) Physical deconditioning


Code(s): R53.81 - OTHER MALAISE   Status: Acute   





(11) Constipation


Code(s): K59.00 - CONSTIPATION, UNSPECIFIED   Status: Acute   





(12) Paroxysmal atrial fibrillation with RVR


Code(s): I48.0 - PAROXYSMAL ATRIAL FIBRILLATION   Status: Acute   





(13) Leukocytosis


Code(s): D72.829 - ELEVATED WHITE BLOOD CELL COUNT, UNSPECIFIED   Status: 

Resolved   





- Plan


Continue oral prednisone 40 mg daily and insulin therapy


Continue eliquis


PT/OT eval and treat.


Wound care to continue


For discharge tomorrow. either to SNF or home.

## 2019-08-12 LAB
ANION GAP SERPL CALC-SCNC: 13 MMOL/L (ref 10–20)
BASOPHILS # BLD AUTO: 0.1 THOU/UL (ref 0–0.2)
BASOPHILS NFR BLD AUTO: 0.4 % (ref 0–1)
BUN SERPL-MCNC: 18 MG/DL (ref 9.8–20.1)
CALCIUM SERPL-MCNC: 8.7 MG/DL (ref 7.8–10.44)
CHLORIDE SERPL-SCNC: 97 MMOL/L (ref 98–107)
CO2 SERPL-SCNC: 26 MMOL/L (ref 23–31)
CREAT CL PREDICTED SERPL C-G-VRATE: 64 ML/MIN (ref 70–130)
EOSINOPHIL # BLD AUTO: 1.3 THOU/UL (ref 0–0.7)
EOSINOPHIL NFR BLD AUTO: 8.4 % (ref 0–10)
GLUCOSE SERPL-MCNC: 129 MG/DL (ref 83–110)
HGB BLD-MCNC: 12.4 G/DL (ref 12–16)
LYMPHOCYTES # BLD: 3.8 THOU/UL (ref 1.2–3.4)
LYMPHOCYTES NFR BLD AUTO: 25.2 % (ref 21–51)
MCH RBC QN AUTO: 30.9 PG (ref 27–31)
MCV RBC AUTO: 96.8 FL (ref 78–98)
MONOCYTES # BLD AUTO: 0.9 THOU/UL (ref 0.11–0.59)
MONOCYTES NFR BLD AUTO: 5.7 % (ref 0–10)
NEUTROPHILS # BLD AUTO: 9.1 THOU/UL (ref 1.4–6.5)
NEUTROPHILS NFR BLD AUTO: 60.4 % (ref 42–75)
PLATELET # BLD AUTO: 551 THOU/UL (ref 130–400)
POTASSIUM SERPL-SCNC: 4.1 MMOL/L (ref 3.5–5.1)
RBC # BLD AUTO: 4 MILL/UL (ref 4.2–5.4)
SODIUM SERPL-SCNC: 132 MMOL/L (ref 136–145)
WBC # BLD AUTO: 15.1 THOU/UL (ref 4.8–10.8)

## 2019-08-12 RX ADMIN — INSULIN GLARGINE SCH MLS: 100 INJECTION, SOLUTION SUBCUTANEOUS at 09:44

## 2019-08-12 RX ADMIN — INSULIN LISPRO PRN UNIT: 100 INJECTION, SOLUTION INTRAVENOUS; SUBCUTANEOUS at 11:29

## 2019-08-12 RX ADMIN — Medication SCH ML: at 19:47

## 2019-08-12 RX ADMIN — HYDROCODONE BITARTRATE AND ACETAMINOPHEN PRN TAB: 7.5; 325 TABLET ORAL at 05:54

## 2019-08-12 RX ADMIN — Medication SCH ML: at 09:47

## 2019-08-12 RX ADMIN — ASPIRIN SCH MG: 81 TABLET ORAL at 07:38

## 2019-08-12 RX ADMIN — INSULIN LISPRO PRN UNIT: 100 INJECTION, SOLUTION INTRAVENOUS; SUBCUTANEOUS at 17:48

## 2019-08-12 RX ADMIN — HYDROCODONE BITARTRATE AND ACETAMINOPHEN PRN TAB: 7.5; 325 TABLET ORAL at 14:58

## 2019-08-12 RX ADMIN — INSULIN LISPRO SCH: 100 INJECTION, SOLUTION INTRAVENOUS; SUBCUTANEOUS at 09:46

## 2019-08-12 RX ADMIN — DOCUSATE SODIUM 50 MG AND SENNOSIDES 8.6 MG SCH TAB: 8.6; 5 TABLET, FILM COATED ORAL at 19:46

## 2019-08-12 RX ADMIN — INSULIN GLARGINE SCH MLS: 100 INJECTION, SOLUTION SUBCUTANEOUS at 19:46

## 2019-08-12 RX ADMIN — ACETAMINOPHEN AND CODEINE PHOSPHATE PRN TAB: 300; 30 TABLET ORAL at 02:39

## 2019-08-12 RX ADMIN — INSULIN LISPRO SCH UNIT: 100 INJECTION, SOLUTION INTRAVENOUS; SUBCUTANEOUS at 11:29

## 2019-08-12 RX ADMIN — DOCUSATE SODIUM 50 MG AND SENNOSIDES 8.6 MG SCH TAB: 8.6; 5 TABLET, FILM COATED ORAL at 07:39

## 2019-08-12 RX ADMIN — INSULIN LISPRO SCH UNIT: 100 INJECTION, SOLUTION INTRAVENOUS; SUBCUTANEOUS at 17:48

## 2019-08-12 RX ADMIN — ACETAMINOPHEN AND CODEINE PHOSPHATE PRN TAB: 300; 30 TABLET ORAL at 09:49

## 2019-08-12 NOTE — PDOC.HOSPP
- Subjective


Encounter Date: 08/12/19


Encounter Time: 11:22


Subjective: 


85 y/o female with HTN, DM and bullous pemphigoid admitted with left leg pain 

and worsening bullous eruption on the skin. Found to have DVT and was started 

on anticoagulant. Patient who initially declined escalation of steroid for 

bullous eruptions due to hyperglycemic effect of steroid later acceded to our 

plan and steroid was started. Flipped into atrial fib and back to  on 8/10/

2019. Denied chest pain or fever. Still have some pruritus. Feeling better 

overall.








- Objective


Vital Signs & Weight: 


 Vital Signs (12 hours)











  Temp Pulse Resp BP Pulse Ox


 


 08/12/19 11:50  97.8 F  75  16  144/76 H  97


 


 08/12/19 07:36  97.9 F  86  16  169/66 H  98


 


 08/12/19 04:00  97.7 F  64  16  138/80  96








 Weight











Admit Weight                   199 lb


 


Weight                         198 lb 3.2 oz














I&O: 


 











 08/11/19 08/12/19 08/13/19





 06:59 06:59 06:59


 


Intake Total 1900 2140 


 


Balance 1900 2140 











Result Diagrams: 


 08/12/19 04:42





 08/12/19 04:42


Additional Labs: 


 Accuchecks











  08/12/19 08/12/19 08/11/19





  10:48 05:53 20:29


 


POC Glucose  254 H  86  275 H














  08/11/19





  16:57


 


POC Glucose  215 H














ROS





- Medication


Medications: 


Active Medications











Generic Name Dose Route Start Last Admin





  Trade Name Freq  PRN Reason Stop Dose Admin


 


Acetaminophen  650 mg  08/04/19 14:23  08/06/19 21:08





  Tylenol  PO   650 mg





  Q4H PRN   Administration





  Headache/Fever/Mild Pain (1-3)   





     





     





     


 


Acetaminophen/Codeine Phosphate  1 tab  08/04/19 18:21  08/12/19 09:49





  Tylenol #3  PO   1 tab





  Q6H PRN   Administration





  Severe Pain (7-10)   





     





     





     


 


Hydrocodone Bitart/Acetaminophen  1 tab  08/04/19 14:23  08/12/19 14:58





  Norco 7.5/325  PO   1 tab





  Q4H PRN   Administration





  Moderate Pain (4-6)   





     





     





     


 


Apixaban  5 mg  08/07/19 09:00  08/12/19 07:38





  Eliquis  PO   5 mg





  BID TACO   Administration





     





     





     





     


 


Aspirin  81 mg  08/05/19 09:00  08/12/19 07:38





  Ecotrin  PO   81 mg





  QAM TACO   Administration





     





     





     





     


 


Atorvastatin Calcium  20 mg  08/04/19 21:00  08/11/19 20:53





  Lipitor  PO   20 mg





  HS TACO   Administration





     





     





     





     


 


Diphenhydramine HCl  25 mg  08/11/19 14:06  08/12/19 02:39





  Benadryl  PO   25 mg





  Q6H PRN   Administration





  Itching   





     





     





     


 


Docusate Sodium  100 mg  08/07/19 23:08  08/11/19 14:12





  Colace  PO   100 mg





  BID PRN   Administration





  Constipation   





     





     





     


 


Furosemide  20 mg  08/05/19 09:00  08/12/19 07:39





  Lasix  PO   20 mg





  QAM TACO   Administration





     





     





     





     


 


Insulin Glargine 30 units/  0.3 mls @ 0.2 mls/hr  08/08/19 21:00  08/12/19 09:44





  Miscellaneous Medication  SC   0.3 mls





  BID TACO   Administration





     





     





     





     


 


Insulin Human Lispro  0 units  08/08/19 08:37  08/12/19 11:29





  Humalog  SC   6 unit





  .MODERATE SLIDING SC PRN   Administration





  Moderate Correctional Scale   





     





     





     


 


Insulin Human Lispro  0 units  08/09/19 21:07  08/09/19 21:37





  Humalog  SC   5 unit





  .BEDTIME SLIDING SC PRN   Administration





  Bedtime Correctional Scale   





     





     





     


 


Insulin Human Lispro  6 units  08/10/19 08:16  08/11/19 18:03





  Humalog  SC   6 unit





  1700 TACO   Administration





     





     





     





     


 


Insulin Human Lispro  6 units  08/10/19 08:16  08/12/19 11:29





  Humalog  SC   6 unit





  1200 TACO   Administration





     





     





     





     


 


Insulin Human Lispro  6 units  08/10/19 08:16  08/12/19 09:46





  Humalog  SC   Not Given





  [facility]   





     





     





     





     


 


Levothyroxine Sodium  50 mcg  08/08/19 06:00  08/12/19 05:54





  Synthroid  PO   50 mcg





  0600 TACO   Administration





     





     





     





     


 


Polyethylene Glycol  34 gm  08/06/19 09:00  08/12/19 07:38





  Miralax  PO   34 gm





  DAILY TACO   Administration





     





     





     





     


 


Prednisone  40 mg  08/08/19 09:00  08/12/19 07:38





  Prednisone  PO   40 mg





  DAILY TACO   Administration





     





     





     





     


 


Senna/Docusate Sodium  1 tab  08/05/19 09:00  08/12/19 07:39





  Senokot S  PO   1 tab





  BID TACO   Administration





     





     





     





     


 


Sodium Chloride  10 ml  08/08/19 09:00  08/12/19 09:47





  Flush - Normal Saline  IVF   10 ml





  Q12HR TACO   Administration





     





     





     





     


 


Tramadol HCl  50 mg  08/04/19 18:21  08/11/19 23:51





  Ultram  PO   50 mg





  Q12H PRN   Administration





  Moderate Pain (4-6)   





     





     





     


 


Verapamil HCl  180 mg  08/05/19 21:00  08/11/19 20:53





  Calan Er  PO   180 mg





  HS TACO   Administration





     





     





     





     


 


Zolpidem Tartrate  5 mg  08/04/19 14:23  08/11/19 23:51





  Ambien  PO   5 mg





  HSPRN PRN   Administration





  Insomnia   





     





     





     














- Exam


awake alert


General - other findings: obese


Eye: anicteric sclera


ENT: normocephalic atraumatic


Neck: supple, symmetric


Heart: RRR


Heart - other findings: with frequent ectopics.


Respiratory: no wheezes, no rales, no ronchi


Gastrointestinal: soft, non-tender, non-distended, normal bowel sounds


Extremities: no cyanosis


Extremeties - other findings: mild L and trace R leg edema


Skin - other findings: multiple bullous erution s of variable sizes, some 

deroofed and with scab


Neurological: CN's grossly intact, no focal deficits


Psychiatric: normal affect, normal behavior, A&O x 3





Hosp A/P


(1) Deep vein thrombosis (DVT) of left lower extremity


Code(s): I82.402 - ACUTE EMBOLISM AND THOMBOS UNSP DEEP VEINS OF L LOW EXTREM   

Status: Acute   


Qualifiers: 


   Chronicity: acute 





(2) Bullous pemphigoid


Code(s): L12.0 - BULLOUS PEMPHIGOID   Status: Acute   





(3) Congestive heart failure with left ventricular diastolic dysfunction


Code(s): I50.30 - UNSPECIFIED DIASTOLIC (CONGESTIVE) HEART FAILURE   Status: 

Chronic   


Qualifiers: 


   Congestive heart failure chronicity: chronic   Qualified Code(s): I50.32 - 

Chronic diastolic (congestive) heart failure   





(4) HLD (hyperlipidemia)


Code(s): E78.5 - HYPERLIPIDEMIA, UNSPECIFIED   Status: Chronic   





(5) HTN (hypertension)


Code(s): I10 - ESSENTIAL (PRIMARY) HYPERTENSION   Status: Chronic   


Qualifiers: 


   Hypertension type: essential hypertension   Qualified Code(s): I10 - 

Essential (primary) hypertension   





(6) Hypothyroidism


Code(s): E03.9 - HYPOTHYROIDISM, UNSPECIFIED   Status: Chronic   





(7) Insulin dependent diabetes mellitus


Code(s): E11.9 - TYPE 2 DIABETES MELLITUS WITHOUT COMPLICATIONS; Z79.4 - LONG 

TERM (CURRENT) USE OF INSULIN   Status: Chronic   





(8) Hyponatremia


Code(s): E87.1 - HYPO-OSMOLALITY AND HYPONATREMIA   Status: Acute   





(9) Hyperglycemia, drug-induced


Code(s): R73.9 - HYPERGLYCEMIA, UNSPECIFIED; T50.905A - ADVERSE EFFECT OF UNSP 

DRUG/MEDS/BIOL SUBST, INIT   Status: Acute   





(10) Physical deconditioning


Code(s): R53.81 - OTHER MALAISE   Status: Acute   





(11) Constipation


Code(s): K59.00 - CONSTIPATION, UNSPECIFIED   Status: Acute   





(12) Paroxysmal atrial fibrillation with RVR


Code(s): I48.0 - PAROXYSMAL ATRIAL FIBRILLATION   Status: Acute   





(13) Leukocytosis


Code(s): D72.829 - ELEVATED WHITE BLOOD CELL COUNT, UNSPECIFIED   Status: 

Resolved   





- Plan


Continue oral prednisone 40 mg daily and insulin therapy


Continue eliquis


Give additional 20 mg of lasix.


monitor renal function


PT/OT eval and treat.


Wound care to continue


For discharge once SNF/swing bed arrangement is concluded

## 2019-08-13 VITALS — TEMPERATURE: 98.8 F | SYSTOLIC BLOOD PRESSURE: 107 MMHG | DIASTOLIC BLOOD PRESSURE: 52 MMHG

## 2019-08-13 LAB
ANION GAP SERPL CALC-SCNC: 15 MMOL/L (ref 10–20)
BASOPHILS # BLD AUTO: 0 THOU/UL (ref 0–0.2)
BASOPHILS NFR BLD AUTO: 0.1 % (ref 0–1)
BUN SERPL-MCNC: 17 MG/DL (ref 9.8–20.1)
CALCIUM SERPL-MCNC: 9 MG/DL (ref 7.8–10.44)
CHLORIDE SERPL-SCNC: 98 MMOL/L (ref 98–107)
CO2 SERPL-SCNC: 28 MMOL/L (ref 23–31)
CREAT CL PREDICTED SERPL C-G-VRATE: 71 ML/MIN (ref 70–130)
EOSINOPHIL # BLD AUTO: 2 THOU/UL (ref 0–0.7)
EOSINOPHIL NFR BLD AUTO: 10.4 % (ref 0–10)
GLUCOSE SERPL-MCNC: 46 MG/DL (ref 83–110)
HGB BLD-MCNC: 13 G/DL (ref 12–16)
LYMPHOCYTES # BLD: 4.3 THOU/UL (ref 1.2–3.4)
LYMPHOCYTES NFR BLD AUTO: 22.9 % (ref 21–51)
MCH RBC QN AUTO: 31.1 PG (ref 27–31)
MCV RBC AUTO: 97.4 FL (ref 78–98)
MONOCYTES # BLD AUTO: 1 THOU/UL (ref 0.11–0.59)
MONOCYTES NFR BLD AUTO: 5.2 % (ref 0–10)
NEUTROPHILS # BLD AUTO: 11.5 THOU/UL (ref 1.4–6.5)
NEUTROPHILS NFR BLD AUTO: 61.4 % (ref 42–75)
PLATELET # BLD AUTO: 601 THOU/UL (ref 130–400)
POTASSIUM SERPL-SCNC: 3.4 MMOL/L (ref 3.5–5.1)
RBC # BLD AUTO: 4.17 MILL/UL (ref 4.2–5.4)
SODIUM SERPL-SCNC: 138 MMOL/L (ref 136–145)
WBC # BLD AUTO: 18.8 THOU/UL (ref 4.8–10.8)

## 2019-08-13 RX ADMIN — DOCUSATE SODIUM 50 MG AND SENNOSIDES 8.6 MG SCH TAB: 8.6; 5 TABLET, FILM COATED ORAL at 08:36

## 2019-08-13 RX ADMIN — Medication SCH ML: at 08:36

## 2019-08-13 RX ADMIN — HYDROCODONE BITARTRATE AND ACETAMINOPHEN PRN TAB: 7.5; 325 TABLET ORAL at 04:55

## 2019-08-13 RX ADMIN — INSULIN LISPRO SCH UNIT: 100 INJECTION, SOLUTION INTRAVENOUS; SUBCUTANEOUS at 08:32

## 2019-08-13 RX ADMIN — INSULIN LISPRO PRN UNIT: 100 INJECTION, SOLUTION INTRAVENOUS; SUBCUTANEOUS at 17:50

## 2019-08-13 RX ADMIN — INSULIN LISPRO SCH: 100 INJECTION, SOLUTION INTRAVENOUS; SUBCUTANEOUS at 11:04

## 2019-08-13 RX ADMIN — Medication SCH: at 08:45

## 2019-08-13 RX ADMIN — ASPIRIN SCH MG: 81 TABLET ORAL at 08:33

## 2019-08-13 RX ADMIN — HYDROCODONE BITARTRATE AND ACETAMINOPHEN PRN TAB: 7.5; 325 TABLET ORAL at 15:05

## 2019-08-13 NOTE — DIS
DATE OF ADMISSION:  08/04/2019



DATE OF DISCHARGE:  08/13/2019



PRIMARY CARE PHYSICIAN:  Vanessa Tucker MD



DISCHARGE DIAGNOSES:  

1. Acute deep vein thrombosis of left lower extremity.

2. Bullous pemphigoid with bullous eruptions involving more than 30% of the body.

3. Paroxysmal atrial fibrillation with rapid ventricular response.

4. Hypoglycemia related to insulin use.

5. Constipation.

6. Leukocytosis.

7. Hyponatremia.

8. Hypothyroidism.

9. Insulin-dependent diabetes mellitus.

10. Physical deconditioning.

11. Hyperlipidemia.

12. Chronic diastolic heart failure.



HOSPITAL COURSE:  An 86-year-old female with known history of insulin-dependent

diabetes, bullous pemphigoid, hypertension, and others, admitted with worsening left

leg swelling as well as bullous eruptions of the skin.  Evaluation with venous

Doppler showed acute DVT involving the left lower extremity.  The patient was

started on anticoagulation with Lovenox, which was later transitioned to oral

anticoagulant, Eliquis.  The patient with prior history of bullous pemphigoid, being

treated with oral steroids, but was weaned off oral steroid due to hyperglycemia,

reportedly developed worsening eruptions during the time of development of left leg

swelling.  Hence, she was told that the acute DVT and weaning of steroid triggered

massive eruptions involving the skin.  Steroid therapy was initially discussed with

the patient and relatives, but they declined systemic steroid due to hyperglycemia.

The patient was initially treated with topical steroids with no improvement, hence

following discussion again with family, they agreed to systemic steroid and the

patient was treated with systemic steroid with improvement.  She developed multiple

and scattered bullous eruptions involving all the body except the face.  She also

was noted to have erythematous papules and plaques all over the body.  Bullous

eruptions started drying out with some deroofing, forming some crust, but there was

no fever or cellulitis.  The patient also was found to have physical deconditioning

and gait instability related to involvement of the left lower extremity and feet and

was seen by PT and OT with improvement.  Hospital course also was complicated by

development of paroxysmal atrial fibrillation with RVR.  Rate was adequately

controlled.  The patient already was on anticoagulation.  She reportedly had prior

history of atrial fibrillation during hospitalization in [place], but for unclear

reason, was not started on anticoagulation.  The patient improved and was to be

discharged to a swing bed, but due to delays related to insurance and given the fact

that the patient has an appointment with dermatologist on August 14, the patient was

discharged home with Home Health to facilitate meeting with the dermatologist. 



DISCHARGE CONDITION:  Improving.



DISCHARGE DISPOSITION:  Home with Home Health.



DISCHARGE MEDICATIONS:  

1. Ergocalciferol 5000 units every Sunday.

2. Aspirin 81 mg p.o. daily.

3. Calcium with vitamin D 1 tablet p.o. b.i.d.

4. Levothyroxine 50 mcg p.o. daily.

5. Linzess 145 mcg p.o. daily.

6. Niacin 100 mg p.o. b.i.d.

7. MiraLAX 34 g p.o. daily.

8. Verapamil 180 mg p.o. daily at bedtime.

9. Eliquis 5 mg p.o. b.i.d.

10. Lipitor 20 mg p.o. daily at bedtime.

11. Diphenhydramine 25 mg q.6 p.r.n. for itching.

12. Furosemide 20 mg p.o. daily.

13. Humalog 0 to 15 units according to sliding scale provided for hyperglycemia.

14. Insulin glargine 45 units subcutaneously daily.

15. Prednisone 40 mg p.o. daily.

16. Tramadol 50 mg q.8 p.r.n. for moderate to severe pain.



TIME SPENT:  This discharge took more than 38 minutes.







Job ID:  [id number]

## 2019-08-13 NOTE — PDOC.EVN
Event Note





- Event Note


Event Note: 





RN called - Pt is hypoglycemic. Will reduce Lantus dose.

## 2019-08-13 NOTE — PDOC.HOSPP
- Subjective


Encounter Date: 08/13/19


Encounter Time: 16:14


Subjective: 


85 y/o female with HTN, DM and bullous pemphigoid admitted with left leg pain 

and worsening bullous eruption on the skin. Found to have DVT and was started 

on anticoagulant. Patient who initially declined escalation of steroid for 

bullous eruptions due to hyperglycemic effect of steroid later acceded to our 

plan and steroid was started. Going in and out of atrial fib since 8/10/2019. 

Denied chest pain or fever. Still have some pruritus. Feeling better overall.





- Objective


Vital Signs & Weight: 


 Vital Signs (12 hours)











  Temp Pulse Resp BP BP Pulse Ox


 


 08/13/19 15:00  98.8 F  104 H  18   107/52 L  97


 


 08/13/19 11:11  97.5 F L  88  18  97/56 L   96


 


 08/13/19 07:21  97.6 F  73  19   112/54 L  94 L








 Weight











Admit Weight                   199 lb


 


Weight                         198 lb 3.2 oz














I&O: 


 











 08/12/19 08/13/19 08/14/19





 06:59 06:59 06:59


 


Intake Total 2140 1640 


 


Balance 2140 1640 











Result Diagrams: 


 08/13/19 04:22





 08/13/19 04:22


Additional Labs: 


 Accuchecks











  08/13/19 08/13/19 08/13/19





  11:39 11:01 06:30


 


POC Glucose  142 H  54 L*  130 H














  08/13/19 08/12/19 08/12/19





  05:30 20:44 16:46


 


POC Glucose  62 L  128 H  278 H














ROS





- Medication


Medications: 


Active Medications











Generic Name Dose Route Start Last Admin





  Trade Name Freq  PRN Reason Stop Dose Admin


 


Acetaminophen  650 mg  08/04/19 14:23  08/06/19 21:08





  Tylenol  PO   650 mg





  Q4H PRN   Administration





  Headache/Fever/Mild Pain (1-3)   





     





     





     


 


Acetaminophen/Codeine Phosphate  1 tab  08/04/19 18:21  08/12/19 09:49





  Tylenol #3  PO   1 tab





  Q6H PRN   Administration





  Severe Pain (7-10)   





     





     





     


 


Hydrocodone Bitart/Acetaminophen  1 tab  08/04/19 14:23  08/13/19 15:05





  Norco 7.5/325  PO   1 tab





  Q4H PRN   Administration





  Moderate Pain (4-6)   





     





     





     


 


Apixaban  5 mg  08/07/19 09:00  08/13/19 08:33





  Eliquis  PO   5 mg





  BID TACO   Administration





     





     





     





     


 


Aspirin  81 mg  08/05/19 09:00  08/13/19 08:33





  Ecotrin  PO   81 mg





  QAM TACO   Administration





     





     





     





     


 


Atorvastatin Calcium  20 mg  08/04/19 21:00  08/12/19 19:45





  Lipitor  PO   20 mg





  HS TACO   Administration





     





     





     





     


 


Diphenhydramine HCl  25 mg  08/11/19 14:06  08/13/19 10:56





  Benadryl  PO   25 mg





  Q6H PRN   Administration





  Itching   





     





     





     


 


Docusate Sodium  100 mg  08/07/19 23:08  08/11/19 14:12





  Colace  PO   100 mg





  BID PRN   Administration





  Constipation   





     





     





     


 


Furosemide  20 mg  08/05/19 09:00  08/13/19 08:33





  Lasix  PO   20 mg





  QAM TACO   Administration





     





     





     





     


 


Insulin Glargine 15 units/  0.15 mls @ 0 mls/hr  08/13/19 09:00  08/13/19 08:32





  Miscellaneous Medication  SC   0.15 mls





  BID TACO   Administration





     





     





     





     


 


Insulin Human Lispro  0 units  08/08/19 08:37  08/12/19 17:48





  Humalog  SC   6 unit





  .MODERATE SLIDING SC PRN   Administration





  Moderate Correctional Scale   





     





     





     


 


Levothyroxine Sodium  50 mcg  08/08/19 06:00  08/13/19 04:54





  Synthroid  PO   50 mcg





  0600 TACO   Administration





     





     





     





     


 


Polyethylene Glycol  34 gm  08/06/19 09:00  08/13/19 08:33





  Miralax  PO   34 gm





  DAILY TACO   Administration





     





     





     





     


 


Prednisone  40 mg  08/08/19 09:00  08/13/19 08:36





  Prednisone  PO   40 mg





  DAILY TACO   Administration





     





     





     





     


 


Senna/Docusate Sodium  1 tab  08/05/19 09:00  08/13/19 08:36





  Senokot S  PO   1 tab





  BID TACO   Administration





     





     





     





     


 


Sodium Chloride  10 ml  08/08/19 09:00  08/13/19 08:45





  Flush - Normal Saline  IVF   Not Given





  Q12HR TACO   





     





     





     





     


 


Tramadol HCl  50 mg  08/04/19 18:21  08/12/19 19:46





  Ultram  PO   50 mg





  Q12H PRN   Administration





  Moderate Pain (4-6)   





     





     





     


 


Verapamil HCl  180 mg  08/05/19 21:00  08/12/19 19:45





  Calan Er  PO   180 mg





  HS TACO   Administration





     





     





     





     


 


Zolpidem Tartrate  5 mg  08/04/19 14:23  08/11/19 23:51





  Ambien  PO   5 mg





  HSPRN PRN   Administration





  Insomnia   





     





     





     














- Exam


awake alert


Eye: anicteric sclera


ENT: normocephalic atraumatic


Neck: supple, symmetric


Heart: RRR


Respiratory: no wheezes, no rales, no ronchi


Gastrointestinal: soft, non-tender, non-distended, normal bowel sounds


Extremeties - other findings: Mild left leg edema


Skin - other findings: regressing scattered bullous erutions with some deroofed.


Neurological: CN's grossly intact, no focal deficits


Psychiatric: normal affect, A&O x 3





Hosp A/P


(1) Deep vein thrombosis (DVT) of left lower extremity


Code(s): I82.402 - ACUTE EMBOLISM AND THOMBOS UNSP DEEP VEINS OF L LOW EXTREM   

Status: Acute   


Qualifiers: 


   Chronicity: acute 





(2) Bullous pemphigoid


Code(s): L12.0 - BULLOUS PEMPHIGOID   Status: Acute   





(3) Congestive heart failure with left ventricular diastolic dysfunction


Code(s): I50.30 - UNSPECIFIED DIASTOLIC (CONGESTIVE) HEART FAILURE   Status: 

Chronic   


Qualifiers: 


   Congestive heart failure chronicity: chronic   Qualified Code(s): I50.32 - 

Chronic diastolic (congestive) heart failure   





(4) HLD (hyperlipidemia)


Code(s): E78.5 - HYPERLIPIDEMIA, UNSPECIFIED   Status: Chronic   





(5) HTN (hypertension)


Code(s): I10 - ESSENTIAL (PRIMARY) HYPERTENSION   Status: Chronic   


Qualifiers: 


   Hypertension type: essential hypertension   Qualified Code(s): I10 - 

Essential (primary) hypertension   





(6) Hypothyroidism


Code(s): E03.9 - HYPOTHYROIDISM, UNSPECIFIED   Status: Chronic   





(7) Insulin dependent diabetes mellitus


Code(s): E11.9 - TYPE 2 DIABETES MELLITUS WITHOUT COMPLICATIONS; Z79.4 - LONG 

TERM (CURRENT) USE OF INSULIN   Status: Chronic   





(8) Hyponatremia


Code(s): E87.1 - HYPO-OSMOLALITY AND HYPONATREMIA   Status: Acute   





(9) Hyperglycemia, drug-induced


Code(s): R73.9 - HYPERGLYCEMIA, UNSPECIFIED; T50.905A - ADVERSE EFFECT OF UNSP 

DRUG/MEDS/BIOL SUBST, INIT   Status: Acute   





(10) Physical deconditioning


Code(s): R53.81 - OTHER MALAISE   Status: Acute   





(11) Constipation


Code(s): K59.00 - CONSTIPATION, UNSPECIFIED   Status: Acute   





(12) Paroxysmal atrial fibrillation with RVR


Code(s): I48.0 - PAROXYSMAL ATRIAL FIBRILLATION   Status: Acute   





(13) Leukocytosis


Code(s): D72.829 - ELEVATED WHITE BLOOD CELL COUNT, UNSPECIFIED   Status: 

Resolved   





- Plan


DC bed time insulin and schedule meal time humalog. Will treat according to 

sliding scale only.


Replete serum potassium.


Continue oral prednisone 40 mg daily


Continue eliquis


Will discharge patient today.


Need to keep appointment with Dermatologist tomorrow.


resume home health.

## 2022-07-27 NOTE — CON
DATE OF CONSULTATION:  07/06/2019



HISTORY OF PRESENT ILLNESS:  Ms. Bruno is a very pleasant woman who

unfortunately was diagnosed with bullous pemphigoid.  She had rapid atrial

fibrillation, subsequently admitted to the critical care unit. 



Electrophysiology has been consulted. 



She is actually in no distress when I saw her. 



She denied shortness of breath.



PAST MEDICAL HISTORY:  Remarkable for;

1. Diabetes.

2. Lipid disorder.

3. Hypertension.

4. Hypothyroidism.



SOCIAL HISTORY:  Nonsmoker, nondrinker, and nondrug user.



FAMILY HISTORY:  Negative for lung disease in early age.



REVIEW OF SYSTEMS:  Ten-point review of systems completed, is remarkable only 
for her bullous

pemphigoid which takes she says for ever to heal, but she actually gets very 
tearful

when she starts talking about this. 



PHYSICAL EXAMINATION:

VITAL SIGNS:  Blood pressure 109/56, heart rate 67, respiratory rate 18, and

oximetry is 99. 

HEENT:  Pupils are equal.  Sclerae anicteric. 

NECK:  Supple.  No lymphadenopathy. 

LUNGS:  Clear. 

HEART:  Regular rhythm.  S1, S2 are normal. 

ABDOMEN:  Soft and nontender. 

EXTREMITIES:  Without clubbing, cyanosis, or edema. 

NEUROLOGIC:  Grossly nonfocal.



LABORATORY DATA:  Sodium 137, potassium 3.9, chloride 100, bicarb 29, BUN 14, 
and

creatinine 0.79. 



IMPRESSION:  

1. Supraventricular tachycardia, clinically being followed by Electrophysiology.

2. Bullous pemphigoid. 

Follow the other physicians caring for.



This is a 50 minute consult, with greater than 50% of time spent on unit 
coordinating care.



Job ID:  [id number]



MTDD Acitretin Pregnancy And Lactation Text: This medication is Pregnancy Category X and should not be given to women who are pregnant or may become pregnant in the future. This medication is excreted in breast milk.